# Patient Record
Sex: MALE | Race: WHITE | NOT HISPANIC OR LATINO | Employment: OTHER | URBAN - METROPOLITAN AREA
[De-identification: names, ages, dates, MRNs, and addresses within clinical notes are randomized per-mention and may not be internally consistent; named-entity substitution may affect disease eponyms.]

---

## 2024-07-08 ENCOUNTER — HOSPITAL ENCOUNTER (OUTPATIENT)
Facility: CLINIC | Age: 54
Setting detail: OBSERVATION
Discharge: PSYCHIATRIC OR SUBSTANCE USE FACILITY | End: 2024-07-09
Attending: EMERGENCY MEDICINE
Payer: COMMERCIAL

## 2024-07-08 ENCOUNTER — TELEPHONE (OUTPATIENT)
Dept: BEHAVIORAL HEALTH | Facility: CLINIC | Age: 54
End: 2024-07-08

## 2024-07-08 DIAGNOSIS — F15.90 STIMULANT USE DISORDER: ICD-10-CM

## 2024-07-08 DIAGNOSIS — F12.90 MARIJUANA USE: ICD-10-CM

## 2024-07-08 DIAGNOSIS — R45.851 SUICIDAL IDEATION: ICD-10-CM

## 2024-07-08 DIAGNOSIS — T14.91XA SUICIDE ATTEMPT (H): ICD-10-CM

## 2024-07-08 DIAGNOSIS — F25.0 SCHIZOAFFECTIVE DISORDER, BIPOLAR TYPE (H): ICD-10-CM

## 2024-07-08 PROBLEM — F19.10 POLYSUBSTANCE ABUSE (H): Status: ACTIVE | Noted: 2024-07-08

## 2024-07-08 PROBLEM — F25.9 SCHIZOAFFECTIVE DISORDER, UNSPECIFIED TYPE (H): Status: ACTIVE | Noted: 2024-07-08

## 2024-07-08 LAB
ALBUMIN SERPL BCG-MCNC: 4.6 G/DL (ref 3.5–5.2)
ALBUMIN UR-MCNC: NEGATIVE MG/DL
ALP SERPL-CCNC: 124 U/L (ref 40–150)
ALT SERPL W P-5'-P-CCNC: 48 U/L (ref 0–70)
AMORPH CRY #/AREA URNS HPF: ABNORMAL /HPF
AMPHETAMINES UR QL SCN: ABNORMAL
ANION GAP SERPL CALCULATED.3IONS-SCNC: 9 MMOL/L (ref 7–15)
APAP SERPL-MCNC: <5 UG/ML (ref 10–30)
APPEARANCE UR: CLEAR
AST SERPL W P-5'-P-CCNC: 24 U/L (ref 0–45)
BACTERIA #/AREA URNS HPF: ABNORMAL /HPF
BARBITURATES UR QL SCN: ABNORMAL
BASOPHILS # BLD AUTO: 0.1 10E3/UL (ref 0–0.2)
BASOPHILS NFR BLD AUTO: 1 %
BENZODIAZ UR QL SCN: ABNORMAL
BILIRUB SERPL-MCNC: 0.4 MG/DL
BILIRUB UR QL STRIP: NEGATIVE
BUN SERPL-MCNC: 9.7 MG/DL (ref 6–20)
BZE UR QL SCN: ABNORMAL
CALCIUM SERPL-MCNC: 9 MG/DL (ref 8.6–10)
CANNABINOIDS UR QL SCN: ABNORMAL
CHLORIDE SERPL-SCNC: 97 MMOL/L (ref 98–107)
COLOR UR AUTO: ABNORMAL
CREAT SERPL-MCNC: 0.83 MG/DL (ref 0.67–1.17)
DEPRECATED HCO3 PLAS-SCNC: 28 MMOL/L (ref 22–29)
EGFRCR SERPLBLD CKD-EPI 2021: >90 ML/MIN/1.73M2
EOSINOPHIL # BLD AUTO: 0.1 10E3/UL (ref 0–0.7)
EOSINOPHIL NFR BLD AUTO: 1 %
ERYTHROCYTE [DISTWIDTH] IN BLOOD BY AUTOMATED COUNT: 12.5 % (ref 10–15)
ETHANOL SERPL-MCNC: <0.01 G/DL
FENTANYL UR QL: ABNORMAL
GLUCOSE SERPL-MCNC: 181 MG/DL (ref 70–99)
GLUCOSE UR STRIP-MCNC: NEGATIVE MG/DL
HCT VFR BLD AUTO: 46 % (ref 40–53)
HGB BLD-MCNC: 15.7 G/DL (ref 13.3–17.7)
HGB UR QL STRIP: NEGATIVE
IMM GRANULOCYTES # BLD: 0 10E3/UL
IMM GRANULOCYTES NFR BLD: 0 %
KETONES UR STRIP-MCNC: NEGATIVE MG/DL
LEUKOCYTE ESTERASE UR QL STRIP: NEGATIVE
LYMPHOCYTES # BLD AUTO: 2.9 10E3/UL (ref 0.8–5.3)
LYMPHOCYTES NFR BLD AUTO: 30 %
MAGNESIUM SERPL-MCNC: 2.2 MG/DL (ref 1.7–2.3)
MCH RBC QN AUTO: 30.2 PG (ref 26.5–33)
MCHC RBC AUTO-ENTMCNC: 34.1 G/DL (ref 31.5–36.5)
MCV RBC AUTO: 89 FL (ref 78–100)
MONOCYTES # BLD AUTO: 0.5 10E3/UL (ref 0–1.3)
MONOCYTES NFR BLD AUTO: 5 %
NEUTROPHILS # BLD AUTO: 6.1 10E3/UL (ref 1.6–8.3)
NEUTROPHILS NFR BLD AUTO: 63 %
NITRATE UR QL: NEGATIVE
NRBC # BLD AUTO: 0 10E3/UL
NRBC BLD AUTO-RTO: 0 /100
OPIATES UR QL SCN: ABNORMAL
PCP QUAL URINE (ROCHE): ABNORMAL
PH UR STRIP: 7 [PH] (ref 5–7)
PLATELET # BLD AUTO: 284 10E3/UL (ref 150–450)
POTASSIUM SERPL-SCNC: 4.2 MMOL/L (ref 3.4–5.3)
PROT SERPL-MCNC: 7.4 G/DL (ref 6.4–8.3)
RBC # BLD AUTO: 5.2 10E6/UL (ref 4.4–5.9)
RBC URINE: 1 /HPF
SALICYLATES SERPL-MCNC: <0.3 MG/DL
SODIUM SERPL-SCNC: 134 MMOL/L (ref 135–145)
SP GR UR STRIP: 1.01 (ref 1–1.03)
TSH SERPL DL<=0.005 MIU/L-ACNC: 1.79 UIU/ML (ref 0.3–4.2)
UROBILINOGEN UR STRIP-MCNC: NORMAL MG/DL
WBC # BLD AUTO: 9.7 10E3/UL (ref 4–11)
WBC URINE: 1 /HPF

## 2024-07-08 PROCEDURE — 85025 COMPLETE CBC W/AUTO DIFF WBC: CPT | Performed by: EMERGENCY MEDICINE

## 2024-07-08 PROCEDURE — G0378 HOSPITAL OBSERVATION PER HR: HCPCS

## 2024-07-08 PROCEDURE — 82077 ASSAY SPEC XCP UR&BREATH IA: CPT | Performed by: EMERGENCY MEDICINE

## 2024-07-08 PROCEDURE — 250N000013 HC RX MED GY IP 250 OP 250 PS 637: Performed by: EMERGENCY MEDICINE

## 2024-07-08 PROCEDURE — 80053 COMPREHEN METABOLIC PANEL: CPT | Performed by: EMERGENCY MEDICINE

## 2024-07-08 PROCEDURE — 84443 ASSAY THYROID STIM HORMONE: CPT | Performed by: EMERGENCY MEDICINE

## 2024-07-08 PROCEDURE — 80307 DRUG TEST PRSMV CHEM ANLYZR: CPT | Performed by: EMERGENCY MEDICINE

## 2024-07-08 PROCEDURE — 80143 DRUG ASSAY ACETAMINOPHEN: CPT | Performed by: EMERGENCY MEDICINE

## 2024-07-08 PROCEDURE — 80179 DRUG ASSAY SALICYLATE: CPT | Performed by: EMERGENCY MEDICINE

## 2024-07-08 PROCEDURE — 99285 EMERGENCY DEPT VISIT HI MDM: CPT

## 2024-07-08 PROCEDURE — 93005 ELECTROCARDIOGRAM TRACING: CPT

## 2024-07-08 PROCEDURE — 36415 COLL VENOUS BLD VENIPUNCTURE: CPT | Performed by: EMERGENCY MEDICINE

## 2024-07-08 PROCEDURE — 83735 ASSAY OF MAGNESIUM: CPT | Performed by: EMERGENCY MEDICINE

## 2024-07-08 PROCEDURE — 250N000013 HC RX MED GY IP 250 OP 250 PS 637

## 2024-07-08 PROCEDURE — 81001 URINALYSIS AUTO W/SCOPE: CPT | Performed by: EMERGENCY MEDICINE

## 2024-07-08 RX ORDER — CLONAZEPAM 0.5 MG/1
1 TABLET ORAL
Status: ON HOLD | COMMUNITY
End: 2024-07-16

## 2024-07-08 RX ORDER — CLONAZEPAM 1 MG/1
1 TABLET ORAL
Status: DISCONTINUED | OUTPATIENT
Start: 2024-07-08 | End: 2024-07-09 | Stop reason: HOSPADM

## 2024-07-08 RX ORDER — OLANZAPINE 5 MG/1
5 TABLET ORAL 2 TIMES DAILY PRN
Status: DISCONTINUED | OUTPATIENT
Start: 2024-07-08 | End: 2024-07-09 | Stop reason: HOSPADM

## 2024-07-08 RX ORDER — CHLORPROMAZINE HYDROCHLORIDE 25 MG/1
50 TABLET, FILM COATED ORAL AT BEDTIME
Status: ON HOLD | COMMUNITY
End: 2024-07-16

## 2024-07-08 RX ORDER — OLANZAPINE 5 MG/1
5 TABLET ORAL 2 TIMES DAILY PRN
Status: ON HOLD | COMMUNITY
End: 2024-07-16

## 2024-07-08 RX ORDER — TRAZODONE HYDROCHLORIDE 50 MG/1
50 TABLET, FILM COATED ORAL
Status: DISCONTINUED | OUTPATIENT
Start: 2024-07-08 | End: 2024-07-09 | Stop reason: HOSPADM

## 2024-07-08 RX ORDER — ACETAMINOPHEN 325 MG/1
650 TABLET ORAL EVERY 4 HOURS PRN
Status: DISCONTINUED | OUTPATIENT
Start: 2024-07-08 | End: 2024-07-09 | Stop reason: HOSPADM

## 2024-07-08 RX ORDER — CHLORPROMAZINE HYDROCHLORIDE 50 MG/1
50 TABLET, FILM COATED ORAL AT BEDTIME
Status: DISCONTINUED | OUTPATIENT
Start: 2024-07-08 | End: 2024-07-09 | Stop reason: HOSPADM

## 2024-07-08 RX ORDER — GABAPENTIN 600 MG/1
600 TABLET ORAL 2 TIMES DAILY PRN
Status: ON HOLD | COMMUNITY
End: 2024-07-16

## 2024-07-08 RX ORDER — LANOLIN ALCOHOL/MO/W.PET/CERES
3 CREAM (GRAM) TOPICAL
Status: DISCONTINUED | OUTPATIENT
Start: 2024-07-08 | End: 2024-07-09 | Stop reason: HOSPADM

## 2024-07-08 RX ORDER — GABAPENTIN 600 MG/1
600 TABLET ORAL AT BEDTIME
Status: DISCONTINUED | OUTPATIENT
Start: 2024-07-09 | End: 2024-07-09 | Stop reason: HOSPADM

## 2024-07-08 RX ORDER — GABAPENTIN 600 MG/1
600 TABLET ORAL AT BEDTIME
Status: ON HOLD | COMMUNITY
End: 2024-07-16

## 2024-07-08 RX ORDER — NICOTINE 21 MG/24HR
1 PATCH, TRANSDERMAL 24 HOURS TRANSDERMAL DAILY
Status: DISCONTINUED | OUTPATIENT
Start: 2024-07-08 | End: 2024-07-09 | Stop reason: HOSPADM

## 2024-07-08 RX ORDER — ACETAMINOPHEN 325 MG/1
650 TABLET ORAL ONCE
Status: COMPLETED | OUTPATIENT
Start: 2024-07-08 | End: 2024-07-08

## 2024-07-08 RX ORDER — ARIPIPRAZOLE 5 MG/1
10 TABLET ORAL DAILY
Status: DISCONTINUED | OUTPATIENT
Start: 2024-07-09 | End: 2024-07-09 | Stop reason: HOSPADM

## 2024-07-08 RX ORDER — ARIPIPRAZOLE 10 MG/1
10 TABLET ORAL DAILY
Status: ON HOLD | COMMUNITY
End: 2024-07-16

## 2024-07-08 RX ADMIN — NICOTINE 1 PATCH: 21 PATCH, EXTENDED RELEASE TRANSDERMAL at 22:54

## 2024-07-08 RX ADMIN — CLONAZEPAM 1 MG: 1 TABLET ORAL at 22:54

## 2024-07-08 RX ADMIN — ACETAMINOPHEN 650 MG: 325 TABLET, FILM COATED ORAL at 20:48

## 2024-07-08 ASSESSMENT — COLUMBIA-SUICIDE SEVERITY RATING SCALE - C-SSRS
1. IN THE PAST MONTH, HAVE YOU WISHED YOU WERE DEAD OR WISHED YOU COULD GO TO SLEEP AND NOT WAKE UP?: YES
6. HAVE YOU EVER DONE ANYTHING, STARTED TO DO ANYTHING, OR PREPARED TO DO ANYTHING TO END YOUR LIFE?: YES
3. HAVE YOU BEEN THINKING ABOUT HOW YOU MIGHT KILL YOURSELF?: YES
2. HAVE YOU ACTUALLY HAD ANY THOUGHTS OF KILLING YOURSELF IN THE PAST MONTH?: YES
4. HAVE YOU HAD THESE THOUGHTS AND HAD SOME INTENTION OF ACTING ON THEM?: YES
5. HAVE YOU STARTED TO WORK OUT OR WORKED OUT THE DETAILS OF HOW TO KILL YOURSELF? DO YOU INTEND TO CARRY OUT THIS PLAN?: YES

## 2024-07-08 ASSESSMENT — ACTIVITIES OF DAILY LIVING (ADL)
ADLS_ACUITY_SCORE: 35

## 2024-07-09 ENCOUNTER — TELEPHONE (OUTPATIENT)
Dept: BEHAVIORAL HEALTH | Facility: CLINIC | Age: 54
End: 2024-07-09
Payer: COMMERCIAL

## 2024-07-09 ENCOUNTER — HOSPITAL ENCOUNTER (INPATIENT)
Facility: CLINIC | Age: 54
LOS: 7 days | Discharge: NURSING, REHAB OR LONG-TERM CARE | End: 2024-07-16
Attending: PSYCHIATRY & NEUROLOGY | Admitting: PSYCHIATRY & NEUROLOGY
Payer: COMMERCIAL

## 2024-07-09 VITALS
OXYGEN SATURATION: 97 % | RESPIRATION RATE: 16 BRPM | HEART RATE: 106 BPM | TEMPERATURE: 98.7 F | SYSTOLIC BLOOD PRESSURE: 131 MMHG | BODY MASS INDEX: 31.5 KG/M2 | DIASTOLIC BLOOD PRESSURE: 82 MMHG | HEIGHT: 69 IN | WEIGHT: 212.7 LBS

## 2024-07-09 DIAGNOSIS — F25.9 SCHIZOAFFECTIVE DISORDER, CHRONIC CONDITION WITH ACUTE EXACERBATION (H): Primary | ICD-10-CM

## 2024-07-09 LAB
ATRIAL RATE - MUSE: 108 BPM
DIASTOLIC BLOOD PRESSURE - MUSE: NORMAL MMHG
INTERPRETATION ECG - MUSE: NORMAL
P AXIS - MUSE: 55 DEGREES
PR INTERVAL - MUSE: 132 MS
QRS DURATION - MUSE: 80 MS
QT - MUSE: 326 MS
QTC - MUSE: 436 MS
R AXIS - MUSE: 40 DEGREES
SYSTOLIC BLOOD PRESSURE - MUSE: NORMAL MMHG
T AXIS - MUSE: 45 DEGREES
VENTRICULAR RATE- MUSE: 108 BPM

## 2024-07-09 PROCEDURE — G0378 HOSPITAL OBSERVATION PER HR: HCPCS

## 2024-07-09 PROCEDURE — 124N000002 HC R&B MH UMMC

## 2024-07-09 PROCEDURE — 250N000013 HC RX MED GY IP 250 OP 250 PS 637: Performed by: PSYCHIATRY & NEUROLOGY

## 2024-07-09 PROCEDURE — 99223 1ST HOSP IP/OBS HIGH 75: CPT | Performed by: PSYCHIATRY & NEUROLOGY

## 2024-07-09 PROCEDURE — 250N000013 HC RX MED GY IP 250 OP 250 PS 637

## 2024-07-09 RX ORDER — OLANZAPINE 5 MG/1
5 TABLET ORAL 2 TIMES DAILY PRN
Status: DISCONTINUED | OUTPATIENT
Start: 2024-07-09 | End: 2024-07-09

## 2024-07-09 RX ORDER — GABAPENTIN 600 MG/1
600 TABLET ORAL AT BEDTIME
Status: DISCONTINUED | OUTPATIENT
Start: 2024-07-09 | End: 2024-07-16 | Stop reason: HOSPADM

## 2024-07-09 RX ORDER — ACETAMINOPHEN 325 MG/1
650 TABLET ORAL EVERY 4 HOURS PRN
Status: DISCONTINUED | OUTPATIENT
Start: 2024-07-09 | End: 2024-07-16 | Stop reason: HOSPADM

## 2024-07-09 RX ORDER — MAGNESIUM HYDROXIDE/ALUMINUM HYDROXICE/SIMETHICONE 120; 1200; 1200 MG/30ML; MG/30ML; MG/30ML
30 SUSPENSION ORAL EVERY 4 HOURS PRN
Status: DISCONTINUED | OUTPATIENT
Start: 2024-07-09 | End: 2024-07-16 | Stop reason: HOSPADM

## 2024-07-09 RX ORDER — HYDROXYZINE HYDROCHLORIDE 25 MG/1
25 TABLET, FILM COATED ORAL EVERY 4 HOURS PRN
Status: DISCONTINUED | OUTPATIENT
Start: 2024-07-09 | End: 2024-07-16 | Stop reason: HOSPADM

## 2024-07-09 RX ORDER — NICOTINE 21 MG/24HR
1 PATCH, TRANSDERMAL 24 HOURS TRANSDERMAL DAILY
Status: DISCONTINUED | OUTPATIENT
Start: 2024-07-10 | End: 2024-07-09

## 2024-07-09 RX ORDER — POLYETHYLENE GLYCOL 3350 17 G/17G
17 POWDER, FOR SOLUTION ORAL DAILY PRN
Status: DISCONTINUED | OUTPATIENT
Start: 2024-07-09 | End: 2024-07-16 | Stop reason: HOSPADM

## 2024-07-09 RX ORDER — OLANZAPINE 10 MG/1
10 TABLET ORAL 3 TIMES DAILY PRN
Status: DISCONTINUED | OUTPATIENT
Start: 2024-07-09 | End: 2024-07-16 | Stop reason: HOSPADM

## 2024-07-09 RX ORDER — LANOLIN ALCOHOL/MO/W.PET/CERES
3 CREAM (GRAM) TOPICAL
Status: DISCONTINUED | OUTPATIENT
Start: 2024-07-09 | End: 2024-07-16 | Stop reason: HOSPADM

## 2024-07-09 RX ORDER — NICOTINE 21 MG/24HR
1 PATCH, TRANSDERMAL 24 HOURS TRANSDERMAL DAILY
Status: DISCONTINUED | OUTPATIENT
Start: 2024-07-09 | End: 2024-07-16 | Stop reason: HOSPADM

## 2024-07-09 RX ORDER — CHLORPROMAZINE HYDROCHLORIDE 50 MG/1
50 TABLET, FILM COATED ORAL AT BEDTIME
Status: DISCONTINUED | OUTPATIENT
Start: 2024-07-09 | End: 2024-07-16 | Stop reason: HOSPADM

## 2024-07-09 RX ORDER — GABAPENTIN 600 MG/1
600 TABLET ORAL 2 TIMES DAILY PRN
Status: DISCONTINUED | OUTPATIENT
Start: 2024-07-09 | End: 2024-07-16 | Stop reason: HOSPADM

## 2024-07-09 RX ORDER — ARIPIPRAZOLE 10 MG/1
10 TABLET ORAL DAILY
Status: DISCONTINUED | OUTPATIENT
Start: 2024-07-10 | End: 2024-07-10

## 2024-07-09 RX ORDER — OLANZAPINE 10 MG/2ML
10 INJECTION, POWDER, FOR SOLUTION INTRAMUSCULAR 3 TIMES DAILY PRN
Status: DISCONTINUED | OUTPATIENT
Start: 2024-07-09 | End: 2024-07-16 | Stop reason: HOSPADM

## 2024-07-09 RX ORDER — CLONAZEPAM 0.5 MG/1
1 TABLET ORAL
Status: DISCONTINUED | OUTPATIENT
Start: 2024-07-09 | End: 2024-07-16 | Stop reason: HOSPADM

## 2024-07-09 RX ADMIN — NICOTINE POLACRILEX 2 MG: 2 GUM, CHEWING BUCCAL at 09:11

## 2024-07-09 RX ADMIN — OLANZAPINE 5 MG: 5 TABLET, FILM COATED ORAL at 09:11

## 2024-07-09 RX ADMIN — ARIPIPRAZOLE 10 MG: 5 TABLET ORAL at 09:07

## 2024-07-09 RX ADMIN — CLONAZEPAM 1 MG: 0.5 TABLET ORAL at 21:15

## 2024-07-09 RX ADMIN — GABAPENTIN 600 MG: 600 TABLET, FILM COATED ORAL at 21:15

## 2024-07-09 RX ADMIN — CHLORPROMAZINE HYDROCHLORIDE 50 MG: 50 TABLET, FILM COATED ORAL at 21:17

## 2024-07-09 RX ADMIN — NICOTINE 1 PATCH: 21 PATCH, EXTENDED RELEASE TRANSDERMAL at 21:38

## 2024-07-09 ASSESSMENT — ACTIVITIES OF DAILY LIVING (ADL)
ADLS_ACUITY_SCORE: 35
ADLS_ACUITY_SCORE: 35
ADLS_ACUITY_SCORE: 30
ADLS_ACUITY_SCORE: 35
ADLS_ACUITY_SCORE: 35
DRESS: INDEPENDENT
ADLS_ACUITY_SCORE: 30
ADLS_ACUITY_SCORE: 45
ADLS_ACUITY_SCORE: 35
ADLS_ACUITY_SCORE: 43
ADLS_ACUITY_SCORE: 35
HYGIENE/GROOMING: INDEPENDENT
ADLS_ACUITY_SCORE: 35
ORAL_HYGIENE: INDEPENDENT
ADLS_ACUITY_SCORE: 35

## 2024-07-09 ASSESSMENT — LIFESTYLE VARIABLES: SKIP TO QUESTIONS 9-10: 1

## 2024-07-09 NOTE — ED NOTES
Patient is unsure of the facility he lives at and did not come with any paperwork. Per triage, patient was dropped off by a nurse and then he left. Per chart review patient resides at [facility] [phone]. RN has called facility twice and both times was sent to voicemail without the option to leave a message.

## 2024-07-09 NOTE — ED NOTES
Patient very tired today. States he had a bad day yesterday. Some of the clients at the assisted living were bothering him and he was hearing voices as well. He denies hearing any voices today and he denies SI
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
## 2024-07-09 NOTE — PROGRESS NOTES
Patient got up and went to the bathroom. States he is feeling better than before. Denies thoughts of SI at the moment. Back in the recliner resting.

## 2024-07-09 NOTE — ED NOTES
"54 year old male with history of bipolar, past suicide attempts, substance abuse, and schizoaffective received from ED due to a suicide attempt. Reports that he stockpiled pills because \"I just wanted to end it\". Pt states that he has just \"given up\". Pt has been struggling with substance abuse and reports that he has been to treatment 21 times, but does report that he would like to go to treatment in [place] after inpatient mental healthcare. Pt endorses HI of wanting to harm everybody and anybody. Nursing and risk assessments completed. Assessments reviewed with LMHP and physician. Admission information reviewed with patient. Patient given a tour of Em[facility] and instructions on using the facility. Questions regarding [facility] addressed. Pt safety search completed.      Pt is calm and cooperative. Pt appears down and depressed.  Pt contracts for safety on the unit. Thorazine dose was reported by nurse Meli via text picture of 50mg daily at bedtime, but medication records show he may have been prescribed a different dose.    Hallie from poison control called at [phone] to check on pt and clear him medically.  Pt reports that psychiatrist is Jovana Malagon DNP.      "

## 2024-07-09 NOTE — TELEPHONE ENCOUNTER
R:  [place]  Access Inpatient Bed Call Log 7/8/2024 @ 11:45 PM   Intake has called facilities that have not updated their bed status within the last 12 hours.    [facility] is posting 0 beds.               [facility] is posting 0 beds. [phone]   [facility] is posting 0 beds. [phone]               [facility] is posting 0 beds. [phone]. Per Ariella @ 12:28AM, they are at capacity tonight  [facility] is posting 0 beds. [phone]   [facility] (Ages 18-35) is posting 2 beds. Negative COVID test required, no recent or significant aggression, violence, or sexual assault. [phone]. Pt not age appropriate   [facility] is posting 0 beds. [phone]             [facility] is posting 0 beds. [phone]      [facility] is posting 0 beds. [phone]         Pt remains on work list pending appropriate bed availability.

## 2024-07-09 NOTE — ED NOTES
IP MH Referral Acuity Rating Score (RARS)    LMHP complete at referral to IP MH, with DEC; and, daily while awaiting IP MH placement. Call score to PPS.  CRITERIA SCORING   New 72 HH and Involuntary for IP MH (not adolescent) 0/1   Boarding over 24 hours 0/1   Vulnerable adult at least 55+ with multiple co morbidities; or, Patient age 11 or under 0/1   Suicide ideation without relief of precipitating factors 1/1   Current plan for suicide 1/1   Current plan for homicide 0/1   Imminent risk or actual attempt to seriously harm another without relief of factors precipitating the attempt 0/1   Severe dysfunction in daily living (ex: complete neglect for self care, extreme disruption in vegetative function, extreme deterioration in social interactions) 1/1   Recent (last 2 weeks) or current physical aggression in the ED 0/1   Restraints or seclusion episode in ED 0/1   Verbal aggression, agitation, yelling, etc., while in the ED 0/1   Active psychosis with psychomotor agitation or catatonia 1/1   Need for constant or near constant redirection (from leaving, from others, etc).  0/1   Intrusive or disruptive behaviors 0/1   TOTAL Acuity Total Score: 4     DIEGO Bojorquez
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
## 2024-07-09 NOTE — TELEPHONE ENCOUNTER
S: ESTHER Oates  Allison  calling at 10:47 PM  about a 54 year old/Male presenting with SA.    B: Pt arrived via  Facility staff from assisted living brought him in . Presenting problem, stressors: Came in following a SA, ingested gabapentin and thorazine has been stockpiling to overdose. Also plans to purchase a firearm to kill himself. Also experiencing paranoia and AH where people are telling him that they don't like him and want to kill him. Been using meth more frequent and marijuana daily, endorses non spec thoughts to hurt the people who want to harm him.    Pt affect in ED: Blunted  Pt Dx: Schizoaffective Disorder Bipolar type  Previous IPMH hx? Yes: In [place] in APR/MAY  Pt endorses SI with a plan to Use firearm or overdose    Hx of suicide attempt? Yes: Overdosed on muscle relaxers leading to SA in [place] earlier this year and previous attempt of trying to hang himself  Pt denies SIB  Pt endorses HI towards The voices/people who want to kill him, no plans or intent   Pt endorses auditory hallucinations .   Pt RARS Score: 4    Hx of aggression/violence, sexual offenses, legal concerns, Epic care plan? describe: No  Current concerns for aggression this visit? No  Does pt have a history of Civil Commitment? No  Is Pt their own guardian? Yes    Pt is prescribed medication. Is patient medication compliant? No Stockpiled   Pt endorses OP services: Receives supports from assisted living  CD concerns: Actively using/consuming meth and marijuana  Acute or chronic medical concerns: No  Does Pt present with specific needs, assistive devices, or exclusionary criteria? None      Pt is ambulatory  Pt is able to perform ADLs independently      A: Pt to be reviewed for [facility] admission. Pt is Voluntary  Preferred placement: [facility]    COVID Symptoms: No  If yes, COVID test required   Utox: Positive for cannabinoids, amphetamines    CMP: Abnormalities: See labs  CBC: WNL  HCG: N/A    R: Patient cleared and ready for  behavioral bed placement: Yes  Pt placed on IP worklist? Yes    Does Patient need a Transfer Center request created? No, Pt is located within [facility] ED, [facility] ED, or [facility] 10:56 PM  R: [place]  Access Inpatient Bed Call Log 7/8/2024 10:55 PM   Intake has called facilities that have not updated their bed status within the last 12 hours.??         *[facility]-- [facility]: @ cap per website.   [place]-- [facility]:  Posting 0 beds. [phone] ECT Tx Patients need to go through APS.   [place]-- [facility]: Posting 0 beds. ECT Tx offered.        [place]-- [facility]:  Posting 0 beds.   [facility]-- [facility]: Posting 0 beds. [phone] ECT Tx offered.   [place] -- [facility] 0 YA beds.  [place] -- [facility] Posting 0 beds. [phone]     [place] -- [facility]: @ cap per website.   [place] -- [facility]:  Posting 0 beds. Low acuity only. [phone]      Pt remains on worklist pending appropriate bed availability.

## 2024-07-09 NOTE — PROGRESS NOTES
Benitez Castillo  July 8, 2024  Plan of Care Hand-off Note     Patient Care Path: inpatient mental health    Plan for Care:   It is the recommendation of this writer that pt be admitted to an inpatient psychiatric unit on the basis of his suicide attempt with continued suicidal plan and intent. Pt is also presenting with paranoia and auditory hallucinations. Pt is voluntary for inpatient admission.    Identified Goals and Safety Issues: decrease SI, AH, and paranoia    Overview:  Gabriela Weston, daughter, PH: [phone] (NEERAJ signed)  Cora [facility] [address] staff, PH: [phone]    Legal Status at Admission: Voluntary/Patient has signed consent for treatment    Psychiatry Consult: No psychiatry consult needed. Pt is at [facility].     Updated MD and RN regarding plan of care.      MOSHE BojorquezSW

## 2024-07-09 NOTE — ED NOTES
[facility]  ED to EMPATH Checklist:      Goal for EMPATH: Suicidality    Current Behavior: Flat Affect, Calm, and Cooperative    Safety Concerns: Suicidal, with a plan to overdose    Legal Hold Status: Voluntary    Medically Cleared by ED provider: Yes and Vital signs stable    Patient Therapeutically Searched: Therapeutic search by ED staff (strings, belts, shoes, pockets, electronics, etc.)    Belongings: Remain with patient    Independent Ambulation at Baseline: Yes/No: Yes    Participates in Care/Conversation: Yes/No: Yes    Patient Informed about EMPATH: Yes/No: Yes    DEC: Ordered and completed    Patient Ready to be Transferred to EMPATH? Yes/No: Yes

## 2024-07-09 NOTE — ED NOTES
Intake called and pt has been accepted to  [facility] at [place]  Dr Hobbs  [phone]  Pt remains calm and quiet.

## 2024-07-09 NOTE — TELEPHONE ENCOUNTER
R: [place]  Access Inpatient Bed Call Log 7/9/24 @7:24 AM:   Intake has called facilities that have not updated the bed status within the last 12 hours.                               [facility] is at capacity            [facility] is posting 0 beds. [phone]    [facility] is posting 0 beds. Negative covid required   [facility] is posting 0 beds. Neg covid. No [facility]. [phone]. Per call at 7:30am to [facility] currently FULL.   [facility] is posting 0 beds. [phone]   [facility] is posting 0 beds. [phone]    [facility] is posting 2 beds. Negative covid. [phone]. Per call at 7:30am to Ben a couple YA and adol beds, however, no child or high acuity beds available.   [facility] is posting 0 beds [phone]       Pt remains on the work list pending appropriate bed availability.      11:17 AM Paged Anjel to review for admission to .   11:20 AM [facility] called and accepts pt for admission to .   11:26 AM Called 3[facility] to inform of pt in queue. She states they are capped at 12 so cannot admit until pending discharge occurs later this shift.   11:29 AM Called Me[facility] and provided placement.   3:07 PM Per LETICIA Desai, unit will call for pt at 4 PM.

## 2024-07-09 NOTE — ED PROVIDER NOTES
"  Emergency Department Note      History of Present Illness     Chief Complaint   Suicidal and Suicide Attempt      HPI   Benitez Castillo is a 54 year old male with a history of suicide attempts, schizoaffective disorder, bipolar disorder, depression, GERD, hypertension, hyperlipidemia, and type 2 diabetes who presents to the ED for a suicide attempt. The patient reports he has been feeling suicidal for the past couple of weeks and saved up a lot of his medications in order to overdose on them. He states that he took 6 thorazine (25 mg) and 20 gabapentin (600 mg) tablets around 5 PM today, which was about 2-1/2 hours prior to my evaluation. He also states that he used meth last night and a few days ago. His nurse from his group home brought him here and he says he wants to be here to take his medications correctly. He endorses homicidal ideation as well and wants to hurt all the people bothering him which he states is \"everybody\", but he denies ever acting on it. He also endorses some fatigue. He denies nausea or vomiting.  He also denies taking anything else such as over-the-counter medications or alcohol.    Independent Historian   None    Review of External Notes   I reviewed the [facility] Service note from 07/04/24.    Past Medical History     Medical History and Problem List   Alcoholism /alcohol abuse  Bipolar disorder  Asthma  Depression  GERD  Hallucinations  Hepatitis C  History of psychiatric care  History of suicide attempt  Hyperlipidemia  Insomnia  Marijuana abuse  Methamphetamine use disorder  Paranoia  Polysubstance abuse  Schizoaffective disorder, bipolar type  Suicide attempt  Suicidal ideations  Tobacco abuse  Type 2 diabetes without complication, without long-term current use of insulin    Medications   Aripiprazole  Chlorpromazine  Lamotrigine  Albuterol inhaler  Trazodone  Mirtazapine  Quetiapine fumarate    Surgical History   Cystectomy pilonidal  Dental surgery  Hernia repair    Physical " "Exam     Patient Vitals for the past 24 hrs:   BP Temp Temp src Pulse Resp SpO2 Height Weight   07/08/24 2211 126/78 98.3  F (36.8  C) Oral 97 16 97 % -- --   07/08/24 2210 -- -- -- -- -- -- 1.753 m (5' 9\") 96.5 kg (212 lb 11.2 oz)   07/08/24 2105 122/76 -- -- 102 14 -- -- --   07/08/24 1936 (!) 151/99 98.7  F (37.1  C) Temporal 118 20 97 % -- --     Physical Exam  Nursing note and vitals reviewed.  Constitutional:  Oriented to person, place, and time. Cooperative.   HENT:   Nose:    Nose normal.   Mouth/Throat:   Mucous membranes are normal.   Eyes:    Conjunctivae normal and EOM are normal.      Pupils are equal, round, and reactive to light.   Neck:    Trachea normal.   Cardiovascular:  Tachycardic rate, regular rhythm, normal heart sounds and normal pulses. No murmur heard.  Pulmonary/Chest:  Effort normal and breath sounds normal.   Abdominal:   Soft. Normal appearance and bowel sounds are normal.      There is no tenderness.      There is no rebound and no CVA tenderness.   Musculoskeletal:  Extremities atraumatic x 4.   Lymphadenopathy:  No cervical adenopathy.   Neurological:   Alert and oriented to person, place, and time. Normal strength.      No cranial nerve deficit or sensory deficit. GCS eye subscore is 4. GCS verbal subscore is 5. GCS motor subscore is 6.   Skin:    Skin is intact. No rash noted.   Psychiatric:   Flat affect presents with ongoing suicidal ideation and vague homicidal ideation.      Diagnostics     Lab Results   Labs Ordered and Resulted from Time of ED Arrival to Time of ED Departure   COMPREHENSIVE METABOLIC PANEL - Abnormal       Result Value    Sodium 134 (*)     Potassium 4.2      Carbon Dioxide (CO2) 28      Anion Gap 9      Urea Nitrogen 9.7      Creatinine 0.83      GFR Estimate >90      Calcium 9.0      Chloride 97 (*)     Glucose 181 (*)     Alkaline Phosphatase 124      AST 24      ALT 48      Protein Total 7.4      Albumin 4.6      Bilirubin Total 0.4     ROUTINE UA WITH " MICROSCOPIC REFLEX TO CULTURE - Abnormal    Color Urine Light Yellow      Appearance Urine Clear      Glucose Urine Negative      Bilirubin Urine Negative      Ketones Urine Negative      Specific Gravity Urine 1.009      Blood Urine Negative      pH Urine 7.0      Protein Albumin Urine Negative      Urobilinogen Urine Normal      Nitrite Urine Negative      Leukocyte Esterase Urine Negative      Bacteria Urine Few (*)     Amorphous Crystals Urine Few (*)     RBC Urine 1      WBC Urine 1     ACETAMINOPHEN LEVEL - Abnormal    Acetaminophen <5.0 (*)    URINE DRUG SCREEN PANEL - Abnormal    Amphetamines Urine Screen Positive (*)     Barbituates Urine Screen Negative      Benzodiazepine Urine Screen Negative      Cannabinoids Urine Screen Positive (*)     Cocaine Urine Screen Negative      Fentanyl Qual Urine Screen Negative      Opiates Urine Screen Negative      PCP Urine Screen Negative     MAGNESIUM - Normal    Magnesium 2.2     TSH WITH FREE T4 REFLEX - Normal    TSH 1.79     ETHYL ALCOHOL LEVEL - Normal    Alcohol ethyl <0.01     SALICYLATE LEVEL - Normal    Salicylate <0.3     CBC WITH PLATELETS AND DIFFERENTIAL    WBC Count 9.7      RBC Count 5.20      Hemoglobin 15.7      Hematocrit 46.0      MCV 89      MCH 30.2      MCHC 34.1      RDW 12.5      Platelet Count 284      % Neutrophils 63      % Lymphocytes 30      % Monocytes 5      % Eosinophils 1      % Basophils 1      % Immature Granulocytes 0      NRBCs per 100 WBC 0      Absolute Neutrophils 6.1      Absolute Lymphocytes 2.9      Absolute Monocytes 0.5      Absolute Eosinophils 0.1      Absolute Basophils 0.1      Absolute Immature Granulocytes 0.0      Absolute NRBCs 0.0         Imaging   No orders to display       EKG   ECG results from 07/08/24   EKG 12-lead, tracing only     Value    Systolic Blood Pressure     Diastolic Blood Pressure     Ventricular Rate 108    Atrial Rate 108    MT Interval 132    QRS Duration 80        QTc 436    P Axis 55     R AXIS 40    T Axis 45    Interpretation ECG      Sinus tachycardia  Otherwise normal ECG  ECG taken at 1959, ECG read at 2003.         Independent Interpretation   None    ED Course      Medications Administered   Medications   nicotine (NICODERM CQ) 21 MG/24HR 24 hr patch 1 patch (1 patch Transdermal $Patch/Med Applied 7/8/24 2254)   acetaminophen (TYLENOL) tablet 650 mg (has no administration in time range)   melatonin tablet 3 mg (has no administration in time range)   traZODone (DESYREL) tablet 50 mg (has no administration in time range)   ARIPiprazole (ABILIFY) tablet 10 mg (has no administration in time range)   chlorproMAZINE (THORAZINE) tablet 50 mg (50 mg Oral Not Given 7/8/24 2254)   clonazePAM (klonoPIN) tablet 1 mg (1 mg Oral $Given 7/8/24 2254)   gabapentin (NEURONTIN) tablet 600 mg (has no administration in time range)   OLANZapine (zyPREXA) tablet 5 mg (has no administration in time range)   nicotine (NICORETTE) gum 2 mg (2 mg Buccal Not Given 7/8/24 2254)   acetaminophen (TYLENOL) tablet 650 mg (650 mg Oral $Given 7/8/24 2048)       Procedures   Procedures     Discussion of Management   None    ED Course   ED Course as of 07/08/24 2338   Mon Jul 08, 2024 1946 I obtained history and examined the patient as noted above.   2000 I called poison control.       Optional/Additional Documentation  None    Medical Decision Making / Diagnosis     CMS Diagnoses: None    MIPS       None    MDM   Benitez Castillo is a 54 year old male who came in after he overdosed on some of his medications in a suicide attempt.  He did come in voluntarily, but we still placed him on an ANTHONY initially.  I spoke with poison control about what he potentially took and they indicated that he would only need to be watched for up to 4 hours.  I also proceeded with the above workup to ensure he did not ingest anything else.  His workup came back unremarkable, other than his urine drug screen, which came back positive for amphetamines  and marijuana.  He was seen by DEC, and they recommend inpatient admission for his suicidal ideation.  Once he was medically cleared, he was then transferred to [facility] as well.    Disposition   The patient was transferred to [facility].     Diagnosis     ICD-10-CM    1. Suicidal ideation  R45.851       2. Suicide attempt (H)  T14.91XA       3. Schizoaffective disorder, unspecified type (H)  F25.9       4. Polysubstance abuse (H)  F19.10                Scribe Disclosure:  I, Saqib Salcedo, am serving as a scribe at 8:04 PM on 7/8/2024 to document services personally performed by Sahil Viveros MD based on my observations and the provider's statements to me.        Sahil Viveros MD  07/08/24 6760

## 2024-07-09 NOTE — ED NOTES
RN spoke with Adwoa Serrano at [address] and gave nurse to nurse report. RN arranged transport via EMS and plan for  in the next 2 hours. Patient notified of plan to transport this evening

## 2024-07-09 NOTE — ED TRIAGE NOTES
Patient comes in with suicidal thoughts. He states he was dropped off by his nurse from his assisted living.  He also took 6 tabs of his thorazine and about 2600mg of gabapentin tonight around 1700 in a suicide attempt.  He is here for help.

## 2024-07-10 LAB
HBA1C MFR BLD: 9.8 %
HCV AB SERPL QL IA: REACTIVE
HOLD SPECIMEN: NORMAL

## 2024-07-10 PROCEDURE — 86803 HEPATITIS C AB TEST: CPT

## 2024-07-10 PROCEDURE — 87522 HEPATITIS C REVRS TRNSCRPJ: CPT

## 2024-07-10 PROCEDURE — 250N000013 HC RX MED GY IP 250 OP 250 PS 637: Performed by: PSYCHIATRY & NEUROLOGY

## 2024-07-10 PROCEDURE — 99255 IP/OBS CONSLTJ NEW/EST HI 80: CPT

## 2024-07-10 PROCEDURE — 250N000013 HC RX MED GY IP 250 OP 250 PS 637

## 2024-07-10 PROCEDURE — 36415 COLL VENOUS BLD VENIPUNCTURE: CPT

## 2024-07-10 PROCEDURE — 83036 HEMOGLOBIN GLYCOSYLATED A1C: CPT

## 2024-07-10 PROCEDURE — 87902 NFCT AGT GNTYP ALYS HEP C: CPT

## 2024-07-10 PROCEDURE — 99223 1ST HOSP IP/OBS HIGH 75: CPT | Performed by: PSYCHIATRY & NEUROLOGY

## 2024-07-10 PROCEDURE — 124N000002 HC R&B MH UMMC

## 2024-07-10 PROCEDURE — 99418 PROLNG IP/OBS E/M EA 15 MIN: CPT

## 2024-07-10 RX ORDER — METFORMIN HCL 500 MG
500 TABLET, EXTENDED RELEASE 24 HR ORAL
Status: DISCONTINUED | OUTPATIENT
Start: 2024-07-10 | End: 2024-07-16

## 2024-07-10 RX ADMIN — NICOTINE POLACRILEX 4 MG: 4 GUM, CHEWING BUCCAL at 17:51

## 2024-07-10 RX ADMIN — CHLORPROMAZINE HYDROCHLORIDE 50 MG: 50 TABLET, FILM COATED ORAL at 21:03

## 2024-07-10 RX ADMIN — NICOTINE 1 PATCH: 21 PATCH, EXTENDED RELEASE TRANSDERMAL at 08:53

## 2024-07-10 RX ADMIN — CLONAZEPAM 1 MG: 0.5 TABLET ORAL at 20:11

## 2024-07-10 RX ADMIN — Medication 15 MG: at 08:53

## 2024-07-10 RX ADMIN — GABAPENTIN 600 MG: 600 TABLET, FILM COATED ORAL at 14:42

## 2024-07-10 RX ADMIN — METFORMIN HYDROCHLORIDE 500 MG: 500 TABLET, EXTENDED RELEASE ORAL at 17:12

## 2024-07-10 RX ADMIN — GABAPENTIN 600 MG: 600 TABLET, FILM COATED ORAL at 21:03

## 2024-07-10 ASSESSMENT — ACTIVITIES OF DAILY LIVING (ADL)
ADLS_ACUITY_SCORE: 30

## 2024-07-10 NOTE — PLAN OF CARE
BEH IP Unit Acuity Rating Score (UARS)  Patient is given one point for every criteria they meet.    CRITERIA SCORING   On a 72 hour hold, court hold, committed, stay of commitment, or revocation. 0    Patient LOS on BEH unit exceeds 20 days. 0  LOS: 1   Patient under guardianship, 55+, otherwise medically complex, or under age 11. 1   Suicide ideation without relief of precipitating factors. 1   Current plan for suicide. 1   Current plan for homicide. 0   Imminent risk or actual attempt to seriously harm another without relief of factors precipitating the attempt. 1   Severe dysfunction in daily living (ex: complete neglect for self care, extreme disruption in vegetative function, extreme deterioration in social interactions). 1   Recent (last 7 days) or current physical aggression in the ED or on unit. 0   Restraints or seclusion episode in past 72 hours. 0   Recent (last 7 days) or current verbal aggression, agitation, yelling, etc., while in the ED or unit. 0   Active psychosis. 0   Need for constant or near constant redirection (from leaving, from others, etc).  0   Intrusive or disruptive behaviors. 0   Patient requires 3 or more hours of individualized nursing care per 8-hour shift (i.e. for ADLs, meds, therapeutic interventions). 0   TOTAL 5

## 2024-07-10 NOTE — PLAN OF CARE
Problem: Adult Behavioral Health Plan of Care  Goal: Adheres to Safety Considerations for Self and Others  Outcome: Not Progressing  Intervention: Develop and Maintain Individualized Safety Plan  Recent Flowsheet Documentation  Taken 7/10/2024 1025 by Brittany Gonzalez RN  Safety Measures:   safety rounds completed   suicide check-in completed   Goal Outcome Evaluation:    Plan of Care Reviewed With: patient    Pt presenting flat but brighten on approached . Pt out in the milieu interacting well with peers and staff . Pt is medication complaint . Pt does admits he continue  to hear voices though they are not as loud telling him to hurt himself but contracts for safety . He states he is always Anxious and depressed due to the voices he keeps hearing .   Pt was given Gabapentin 600mg for Anxiety

## 2024-07-10 NOTE — PROGRESS NOTES
07/09/24 2242   Patient Belongings   Did you bring any home meds/supplements to the hospital?  No   Belongings Search Yes   Clothing Search Yes   Second Staff Yvon and Hemant   Comment See Notes     Pt Locker:  Blue pair of jeans, Belt  Shoes, socks  Cell phone  Cigarettes box/card becker  2 E cigarettes  Lighter  Key  Wallet   56 cents   Valuables Env# [id number]  [place] ID card  Visa card ([id number])  EBT Card(9808)  .be  EBT card (0455)  A               Admission:  I am responsible for any personal items that are not sent to the safe or pharmacy.  [facility] is not responsible for loss, theft or damage of any property in my possession.    Signature:  _________________________________ Date: _______  Time: _____                                              Staff Signature:  ____________________________ Date: ________  Time: _____      2nd Staff person, if patient is unable/unwilling to sign:    Signature: ________________________________ Date: ________  Time: _____     Discharge:  [facility] has returned all of my personal belongings:    Signature: _________________________________ Date: ________  Time: _____                                          Staff Signature:  ____________________________ Date: ________  Time: _____
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
## 2024-07-10 NOTE — PLAN OF CARE
Problem: Suicide Risk  Goal: Absence of Self-Harm  Outcome: Progressing   Goal Outcome Evaluation:       Patient was sleeping soundly in the beginning of the shift. No complaints made. No behavioral issues observed. He slept the whole night uninterrupted for 8.5 hours.

## 2024-07-10 NOTE — PROVIDER NOTIFICATION
07/10/24 1219   Individualization/Patient Specific Goals   Patient Personal Strengths family/social support;expressive of emotions;resourceful;resilient;stable living environment   Patient Vulnerabilities lacks insight into illness;substance abuse/addiction;history of unsuccessful treatment;traumatic event;other (see comments)  (one of pt's sons  by suicide in .)   Anxieties, Fears or Concerns Pt endorses SI with plan to either overdose on medication or purchase a gun   Patient/Family-Specific Goals (Include Timeframe) Family would like to see pt back on VIMAL medication   Interprofessional Rounds   Summary Pt admitted to unit last night. Pt has been isolative in his room. Discharge plan is being formulated as we are gathering collateral information.   Participants nursing;OT;psychiatrist;CTC   Behavioral Team Discussion   Participants Dr. Hemant Hobbs MD; Brittany Gonzalez RN; Rosa Maria Johns, OT; Carmen Lilly, BRANDI, [facility], CTC   Progress pt just admitted to unit and is still orienting or stabilizing.   Anticipated length of stay 7-10 days   Continued Stay Criteria/Rationale medication management and evaluation, address and monitor SI and HI.   Medical/Physical see H&P   Precautions see below   Plan to return to assisted living   Rationale for change in precautions or plan NA   Safety Plan per unit protocol   Anticipated Discharge Disposition assisted living     PRECAUTIONS AND SAFETY    Behavioral Orders   Procedures    Code 1 - Restrict to Unit    Routine Programming     As clinically indicated    Status 15     Every 15 minutes.    Suicide precautions: Suicide Risk: LOW; Clinical rationale to override score: modification to the care environment, response to medication     Consider searching patient belongings according to policy for contraband or items that could be used for self-harm.     Order Specific Question:   Suicide Risk     Answer:   LOW     Order Specific Question:   Clinical rationale to override  score:     Answer:   modification to the care environment     Order Specific Question:   Clinical rationale to override score:     Answer:   response to medication       Safety  Patient Location: conference room  Observed Behavior: sitting  Safety Measures: safety rounds completed, suicide check-in completed  Suicidality: Status 15, Unpredictable frequency of checking on patient

## 2024-07-10 NOTE — PLAN OF CARE
Problem: Suicide Risk  Goal: Absence of Self-Harm  Outcome: Progressing  Intervention: Assess Risk to Self and Maintain Safety    Problem: Depressive Symptoms  Goal: Depressive Symptoms  Description: Signs and symptoms of listed problems will be absent or manageable.  Outcome: Progressing     Problem: Pain Acute  Goal: Optimal Pain Control and Function  Outcome: Progressing  Intervention: Prevent or Manage Pain  Patient in room at the beginning of the shift, eating and drinking adequately. Patient endorsed SI thoughts but kush for safety, safety checks and precautions in place. Patient denies pain or discomfort. Denies anxiety, but endorse depression. Medication compliant. No side effects of medications noted. Selectively engage with peers, no behavioral or safety concerns noted. Will continue to monitor and offer therapeutic support as needed for the rest of the shift per plan of care.
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
## 2024-07-10 NOTE — PLAN OF CARE
Goal Outcome Evaluation:    Problem: Adult Behavioral Health Plan of Care  Goal: Plan of Care Review  Outcome: Progressing  Flowsheets (Taken 7/9/2024 1938)  Patient Agreement with Plan of Care: agrees     Problem: Suicide Risk  Goal: Absence of Self-Harm  Outcome: Progressing  Intervention: Assess Risk to Self and Maintain Safety  Recent Flowsheet Documentation  Taken 7/9/2024 1938 by Adwoa Cristina RN  Behavior Management: impulse control promoted  Self-Harm Prevention: environmental self-harm risks assessed  Enhanced Safety Measures: review medications for side effects with activity  Intervention: Promote Psychosocial Wellbeing  Recent Flowsheet Documentation  Taken 7/9/2024 1938 by Adwoa Cristina RN  Supportive Measures:   active listening utilized   decision-making supported   positive reinforcement provided   relaxation techniques promoted   self-care encouraged   verbalization of feelings encouraged  Sleep/Rest Enhancement:   comfort measures   medication  Family/Support System Care: (no family presnt this shift) other (see comments)  Intervention: Establish Safety Plan and Continuity of Care  Recent Flowsheet Documentation  Taken 7/9/2024 1938 by Adwoa Cristina RN  Safe Transition Promotion: protective factors promoted     Admission Note    S: Pt is a 54 year old male admitted from [facility] for reported recent suicide attempt with drug overdose of several tablets of Thorazine and Gabapentin yesterday.     B: Pt has a past history for schizoaffective  Disorder which is complicated by substance use. Pt reports that his medications have partially been effective in maintaining mood stability and reducing auditory hallucinations. Pt's recent relapsed on meth led to worsening of psych symptoms because it led to nonadherent of all psych medications. Pt began to experience command hallucinations to commit suicide, which he attempted before going to ED. Pt is still endorsing auditory hallucinations but  "he feels safe in the hospital, is hoping that he can receive help with his medications to address his depressed mood, auditory hallucinations, and suicide ideations.    A: Pt presented with a flat affect, calm mood. Pt endorsed anxiety 6/10, depression 5/10, hearing voices \"telling me to to kill myself\". Pt contracts for safety while in the hospital. Pt denied pain, SI, HI, visual hallucinations. Pt was cooperative with the admission process. This is the first admission for mental illness, has been to treatment for chemical dependence several times. Pt reports using marijuana daily and meth every weekend. Also smokes 2 packs of cigarettes daily.     R: Pt will be seen by psychiatrist, CTC, and IM tomorrow.                                       "

## 2024-07-10 NOTE — PHARMACY-ADMISSION MEDICATION HISTORY
Pharmacist Admission Medication History    Admission medication history is complete. The information provided in this note is only as accurate as the sources available at the time of the update.    Information Source(s): Facility (U/NH/) medication list/MAR via N/A    Pertinent Information:   - Clonazepam: MAR lists clonazepam 10 mg at bedtime PRN.  shows clonazepam 0.5 mg last filled 6/21/24 (#30 for 30 day-supply). Updated list to 0.5 mg, as this is what is being dispensed.   - No PRNs charted as given in the month of July     Changes made to PTA medication list:  Added: tizanidine PRN   Deleted: None  Changed:   Clonazepam 1 mg -> 0.5 mg (see above)     Allergies reviewed with patient and updates made in EHR: yes    Medication History Completed By: LEVY PRESCOTT RPH 7/10/2024 2:24 PM    PTA Med List   Medication Sig Last Dose    ARIPiprazole (ABILIFY) 10 MG tablet Take 10 mg by mouth daily 7/8/2024 at AM    chlorproMAZINE (THORAZINE) 25 MG tablet Take 50 mg by mouth at bedtime 7/7/2024 at PM    clonazePAM (KLONOPIN) 0.5 MG tablet Take 1 mg by mouth nightly as needed for anxiety Unknown    gabapentin (NEURONTIN) 600 MG tablet Take 600 mg by mouth at bedtime 7/7/2024 at PM    gabapentin (NEURONTIN) 600 MG tablet Take 600 mg by mouth 2 times daily as needed for other (anxiety.) Unknown    OLANZapine (ZYPREXA) 5 MG tablet Take 5 mg by mouth 2 times daily as needed (anxiety, agitation) Unknown    tiZANidine (ZANAFLEX) 4 MG tablet Take 4 mg by mouth every 6 hours as needed for muscle spasms Unknown
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
## 2024-07-10 NOTE — PLAN OF CARE
Team Note Due:  Wednesday    Assessment/Intervention/Current Symtoms and Care Coordination:  Chart review and met with team, discussed pt progress, symptomology, and response to treatment.  Discussed the discharge plan and any potential impediments to discharge.    - Writer introduced self to pt and role in his care. Pt stated having a  named Ada that was part of his CADI waiver. He gave writer permission to call [facility] to inquire about his  status. Writer was able to confirm that pt has an active CADI waiver through [facility]. Her name is Ada and contact information is listed below. Writer went back to pt and provided him with her contact information and he signed an NEERAJ for her. Writer asked if pt had any questions and he stated not at the moment. Tentatively discussed what some of his outpatient needs might be. He reported he would be interested in therapy.     Discharge Plan or Goal:  To return to [facility]- stated in DEC assessment that he may return, CTC will confirm.     Barriers to Discharge:  Patient requires further psychiatric stabilization due to current symptomology and medication management with possible adjustments    Referral Status:  Pt reports interest in therapy     Legal Status:  Voluntary    Contacts:  [place], [facility], [place]: [phone]  No NEERAJ needed as pt was living there prior to admission.    NEERAJ: Gabriela Weston (daughter): [phone]   *NEERAJ signed on 7/9/2024 and there is electronic copy in chart review under media tab.    NEERAJ: Ada Jose Enrique/[facility] : [phone]     Upcoming Meetings and Dates/Important Information and next steps:  Contact assisted living  Contact Ada

## 2024-07-11 LAB
CHOLEST SERPL-MCNC: 215 MG/DL
HCV RNA SERPL NAA+PROBE-ACNC: ABNORMAL IU/ML
HCV RNA SERPL NAA+PROBE-LOG IU: 7.4 {LOG_IU}/ML
HDLC SERPL-MCNC: 22 MG/DL
LDLC SERPL CALC-MCNC: 130 MG/DL
NONHDLC SERPL-MCNC: 193 MG/DL
TRIGL SERPL-MCNC: 316 MG/DL

## 2024-07-11 PROCEDURE — 36415 COLL VENOUS BLD VENIPUNCTURE: CPT

## 2024-07-11 PROCEDURE — 80061 LIPID PANEL: CPT

## 2024-07-11 PROCEDURE — 124N000002 HC R&B MH UMMC

## 2024-07-11 PROCEDURE — 250N000013 HC RX MED GY IP 250 OP 250 PS 637: Performed by: PSYCHIATRY & NEUROLOGY

## 2024-07-11 PROCEDURE — 99231 SBSQ HOSP IP/OBS SF/LOW 25: CPT | Performed by: PSYCHIATRY & NEUROLOGY

## 2024-07-11 PROCEDURE — 250N000013 HC RX MED GY IP 250 OP 250 PS 637

## 2024-07-11 RX ORDER — CHLORPROMAZINE HYDROCHLORIDE 50 MG/1
50 TABLET, FILM COATED ORAL 3 TIMES DAILY PRN
Status: DISCONTINUED | OUTPATIENT
Start: 2024-07-11 | End: 2024-07-16 | Stop reason: HOSPADM

## 2024-07-11 RX ORDER — ROSUVASTATIN CALCIUM 10 MG/1
20 TABLET, COATED ORAL DAILY
Status: DISCONTINUED | OUTPATIENT
Start: 2024-07-11 | End: 2024-07-16 | Stop reason: HOSPADM

## 2024-07-11 RX ADMIN — METFORMIN HYDROCHLORIDE 500 MG: 500 TABLET, EXTENDED RELEASE ORAL at 17:19

## 2024-07-11 RX ADMIN — NICOTINE 1 PATCH: 21 PATCH, EXTENDED RELEASE TRANSDERMAL at 07:56

## 2024-07-11 RX ADMIN — CLONAZEPAM 1 MG: 0.5 TABLET ORAL at 20:20

## 2024-07-11 RX ADMIN — GABAPENTIN 600 MG: 600 TABLET, FILM COATED ORAL at 21:12

## 2024-07-11 RX ADMIN — ROSUVASTATIN 20 MG: 10 TABLET, FILM COATED ORAL at 14:23

## 2024-07-11 RX ADMIN — GABAPENTIN 600 MG: 600 TABLET, FILM COATED ORAL at 17:51

## 2024-07-11 RX ADMIN — NICOTINE POLACRILEX 4 MG: 4 GUM, CHEWING BUCCAL at 17:52

## 2024-07-11 RX ADMIN — Medication 15 MG: at 07:56

## 2024-07-11 RX ADMIN — CHLORPROMAZINE HYDROCHLORIDE 50 MG: 50 TABLET, FILM COATED ORAL at 21:12

## 2024-07-11 ASSESSMENT — ACTIVITIES OF DAILY LIVING (ADL)
ADLS_ACUITY_SCORE: 30
LAUNDRY: WITH SUPERVISION
ADLS_ACUITY_SCORE: 30
HYGIENE/GROOMING: INDEPENDENT
DRESS: STREET CLOTHES;SCRUBS (BEHAVIORAL HEALTH);INDEPENDENT
ORAL_HYGIENE: INDEPENDENT
ORAL_HYGIENE: INDEPENDENT
ADLS_ACUITY_SCORE: 30
ADLS_ACUITY_SCORE: 30
HYGIENE/GROOMING: INDEPENDENT
ADLS_ACUITY_SCORE: 30

## 2024-07-11 NOTE — PLAN OF CARE
Initial meeting note:    Therapist introduced self to patient and discussed psychotherapy service available to patient.     Pt response: Pt expressed interest in meeting with therapist    Plan: Made plan to meet with pt again; began identifying goals

## 2024-07-11 NOTE — PLAN OF CARE
BEH IP Unit Acuity Rating Score (UARS)  Patient is given one point for every criteria they meet.    CRITERIA SCORING   On a 72 hour hold, court hold, committed, stay of commitment, or revocation. 0    Patient LOS on BEH unit exceeds 20 days. 0  LOS: 2   Patient under guardianship, 55+, otherwise medically complex, or under age 11. 1   Suicide ideation without relief of precipitating factors. 1   Current plan for suicide. 0   Current plan for homicide. 0   Imminent risk or actual attempt to seriously harm another without relief of factors precipitating the attempt. 0   Severe dysfunction in daily living (ex: complete neglect for self care, extreme disruption in vegetative function, extreme deterioration in social interactions). 1   Recent (last 7 days) or current physical aggression in the ED or on unit. 0   Restraints or seclusion episode in past 72 hours. 0   Recent (last 7 days) or current verbal aggression, agitation, yelling, etc., while in the ED or unit. 0   Active psychosis. 0   Need for constant or near constant redirection (from leaving, from others, etc).  0   Intrusive or disruptive behaviors. 0   Patient requires 3 or more hours of individualized nursing care per 8-hour shift (i.e. for ADLs, meds, therapeutic interventions). 0   TOTAL 3

## 2024-07-11 NOTE — PROGRESS NOTES
CLINICAL NUTRITION SERVICES    Reason for Assessment:  Nutrition education regarding carb counting     Diet History:  Pt reports no history of receiving education on carb counting in the past.     Nutrition Diagnosis:  Food- and nutrition-related knowledge deficit r/t no previous knowledge of carb counting AEB pt report of not having received carb counting nutrition education in the past.      Interventions:  Nutrition Education:Survival Information  1)  Provided verbal and written nutrition on diabetes meal planning and importance of consistent carbohydrate intake to optimize glycemic control.  3)  Handouts provided: Carb Counting handout Plate Method for Diabetes.     Goals:   Patient will verbalize at least one food choice (along with the appropriate portion size) in each of the food groups that contain 1 carb unit.      Follow-up:    Patient to ask any further nutrition-related questions before discharge.  In addition, pt may request outpatient RD appointment.    Marilou Prakash MS, RDN, LD  TCU/OB/Ortho Clinical Dietitian  Available via phone and Vocera  Phone: [phone]  Vocera: TCU Clinical Dietitian  Weekend/Holiday Vocera: Weekend Holiday Clinical Dietitian [Multi Site Groups]

## 2024-07-11 NOTE — PLAN OF CARE
Goal Outcome Evaluation:    Plan of Care Reviewed With: patient          Nursing Assessment    Schizoaffective DO  Schizoaffective disorder, chronic condition with acute exacerbation (H)    Admit Date: 7/9/2024    Length of Stay: 2    Patient evaluation continues. Assessed mood,anxiety,thoughts and behavior. Patient is progressing towards goals. Patient is encouraged to participate in groups and assisted to develop healthy coping skills.  Patient admits to soft auditory  hallucinations not able to identify. /86 (BP Location: Right arm)   Pulse 98   Temp 97.4  F (36.3  C) (Temporal)   SpO2 98%     Mood: labile     Patient reports depression 4/10 and reports anxiety 6/10    Affect: tense at times, easily agitated    Sleep: good    Appetite: good    SI: denies    HI: denies    SIB: denies      Medication Compliance yes    Group participation:no    ADL's: independent    Fall risk interventions: none    Discharge planning in process    Refer to daily team meeting notes for individualized plan of care. Nursing will continue to assess.    *Scale is 1-10 and 10 is the worst.

## 2024-07-11 NOTE — PLAN OF CARE
Pt has not attended scheduled occupational therapy sessions. Encourage attendance and participation.     07/11/24 1100   General Information   Has Not Attended OT as of: 07/11/24

## 2024-07-11 NOTE — PLAN OF CARE
Problem: Sleep Disturbance  Goal: Adequate Sleep/Rest  Outcome: Progressing   Goal Outcome Evaluation:       Patient is sleeping soundly in the beginning of the shift.He is in a side-lying position. No complaints made. Nothing unusual noted. He had an uninterrupted sleep for 9.5 hours the whole night.

## 2024-07-11 NOTE — PROGRESS NOTES
Brief Medicine Follow Up Note    Following up regarding fasting lipid panel and primary cardiac prevention w/ diabetes.     Today's vital signs, medications, and nursing notes were reviewed. Labs reviewed most recent serum and urine laboratories.     /86 (BP Location: Right arm)   Pulse 98   Temp 97.4  F (36.3  C) (Temporal)   SpO2 98%     A/P:  Type 2 Diabetes Mellitus, non-insulin-dependent  Please see my consult noted dated 7/10/24 for more details regarding diabetes, which has been uncontrolled. A1c 7.6% in January and lost to follow-up, was 9.6% here. Started on low-dose Metformin on 7/10.   - Continue Metformin 500mg w/ supper. Will begin to uptitrate dose in one week (on 7/17/24, by 500mg increments). Next dose increase to 500mg qAM + 500mg w/ dinner. Please monitor for any side effects (primarily GI upset; n/v, diarrhea, abd pain/cramping)  - Nutrition consulted for optimization of diet  - Ordered CHO moderate diet   - Mgmt of lipids & primary cardiac prevention as below  - Repeat A1c in 3 months  - Follow-up closely w/ PCP for ongoing titration of Metformin as tolerated    HLD  Fasting Lipid Panel obtained as part of a general diabetes workup. This showed elevated cholesterol, trigs, and LDL, and low HDL. ASCVD risk stratification based on these values, his age, smoking status, all yield a 35.4% risk of a major cardiovascular event in the next 10 years. Thus, per UpToDate, should be started on high-intensity statin therapy.  - Will initiate Rosuvastatin 20mg daily as LFTs are WNL. Monitor for side effects such as myalgias   - Close follow-up w/ PCP to bridge care as an OP  - Recheck LFTs in 3 months as an OP  - Recheck Lipid Panel in 4-12 weeks as an OP  ___________________________    ADDENDUM:  I discussed all the above with the patient who is in agreement with the plan. He acknowledges the side effects of the above medications and opts to continue with therapy for now and to watch out for any  side effects. I discussed w/ him importance of adherence to meds and close follow-up w/ PCP as an OP, and he will contact his clinic at discharge to arrange follow-up ([facility]).  ____________________________    Medicine will continue to follow along peripherally while here to uptitrate his Metformin. Please notify Medicine upon discharge so we can discuss plan with Primary Team for follow-up as an OP. He will need close follow-up w/ PCP. Recommendations relayed to primary team via this progress note.  Thank you for the opportunity to be involved in this patient's care.    Carl Smith PA-C  [facility]  Contact information available via [facility] Paging/Directory

## 2024-07-11 NOTE — PROGRESS NOTES
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 condition with acute exacerbation (H)              Assessment:     Patient with diagnosis of schizoaffective disorder complicated by methamphetamine dependence, now presents with worsening mood and suicidal thoughts in the context of meth use and poor medication compliance.          Plan:     Legal:  Voluntary    Medication:  Will continue Abilify and dose increased to 15 mg a day. Continue Thorazine for now. Continue Gabapentin. Assess mood as patient has more days clean from methamphetamine which he last used 3 days ago    Consults: Hospitalist will be consulted if medical issues arise      Multidisciplinary Interventions:  to gather collateral information, coordinate care with outpatient providers and begin follow up planning      Disposition: home with follow up. Assess options for CD treatment      No further change in treatment plan  Patient seen, chart reviewed, case reviewed with  and with nursing.   Case reviewed in multi-disciplinary treatment team.      Hemant Hobbs MD

## 2024-07-11 NOTE — PLAN OF CARE
Problem: Suicide Risk  Goal: Absence of Self-Harm  Outcome: Progressing  Intervention: Assess Risk to Self and Maintain Safety    Problem: Anxiety Signs/Symptoms  Goal: Optimized Cognitive Function (Anxiety Signs/Symptoms)  Outcome: Progressing     Problem: Depressive Symptoms  Goal: Depressive Symptoms  Description: Signs and symptoms of listed problems will be absent or manageable.  Outcome: Progressing     Patient in room sleeping at the beginning of the shift, up for meals, patient presented with flat affect but brightens up on approach, patient endorse anxiety and depression at 6, PRN gabapentin given per patient's request with effects per patient. Patient denies SI/SIB/hallucination, kush for safety, safety checks and precautions in place. Patient is eating and drinking adequately. Patient attended and participated in evening group, medication compliant, no side effects of medications. No behavioral or safety concerns, will continue to monitor and offer therapeutic support as needed for the rest of the shift.    /86 (BP Location: Right arm)   Pulse 98   Temp 97.4  F (36.3  C) (Temporal)   SpO2 98%

## 2024-07-12 LAB — GLUCOSE BLDC GLUCOMTR-MCNC: 148 MG/DL (ref 70–99)

## 2024-07-12 PROCEDURE — 90832 PSYTX W PT 30 MINUTES: CPT

## 2024-07-12 PROCEDURE — 250N000013 HC RX MED GY IP 250 OP 250 PS 637: Performed by: PSYCHIATRY & NEUROLOGY

## 2024-07-12 PROCEDURE — 99233 SBSQ HOSP IP/OBS HIGH 50: CPT | Performed by: PSYCHIATRY & NEUROLOGY

## 2024-07-12 PROCEDURE — 250N000013 HC RX MED GY IP 250 OP 250 PS 637

## 2024-07-12 PROCEDURE — 124N000002 HC R&B MH UMMC

## 2024-07-12 RX ADMIN — GABAPENTIN 600 MG: 600 TABLET, FILM COATED ORAL at 19:50

## 2024-07-12 RX ADMIN — CLONAZEPAM 1 MG: 0.5 TABLET ORAL at 19:49

## 2024-07-12 RX ADMIN — Medication 15 MG: at 08:27

## 2024-07-12 RX ADMIN — HYDROXYZINE HYDROCHLORIDE 25 MG: 25 TABLET, FILM COATED ORAL at 17:46

## 2024-07-12 RX ADMIN — NICOTINE POLACRILEX 4 MG: 4 GUM, CHEWING BUCCAL at 17:46

## 2024-07-12 RX ADMIN — NICOTINE 1 PATCH: 21 PATCH, EXTENDED RELEASE TRANSDERMAL at 08:26

## 2024-07-12 RX ADMIN — METFORMIN HYDROCHLORIDE 500 MG: 500 TABLET, EXTENDED RELEASE ORAL at 17:22

## 2024-07-12 RX ADMIN — ROSUVASTATIN 20 MG: 10 TABLET, FILM COATED ORAL at 08:28

## 2024-07-12 RX ADMIN — ACETAMINOPHEN 650 MG: 325 TABLET, FILM COATED ORAL at 11:36

## 2024-07-12 ASSESSMENT — ACTIVITIES OF DAILY LIVING (ADL)
ORAL_HYGIENE: INDEPENDENT
ADLS_ACUITY_SCORE: 30
DRESS: STREET CLOTHES;INDEPENDENT
ADLS_ACUITY_SCORE: 30
HYGIENE/GROOMING: INDEPENDENT
ADLS_ACUITY_SCORE: 30
LAUNDRY: WITH SUPERVISION
ADLS_ACUITY_SCORE: 30

## 2024-07-12 NOTE — PLAN OF CARE
Team Note Due:  Wednesday    Assessment/Intervention/Current Symtoms and Care Coordination:  Chart review and met with team, discussed pt progress, symptomology, and response to treatment.  Discussed the discharge plan and any potential impediments to discharge.    - Per nursing report, pt's irritability appears to have resolved and he appears more calm and cooperative. Pt is still endorsing hallucinations. Pt will remain over weekend.    - Per unit therapist, pt engaged in therapy session with her. Reported he was interested in continuing therapy on an OP basis.     - Writer left message for pt's CADI  dAa. Provided contact information.    - Writer called and spoke to pt's nurse Cora at his assisted living. He confirmed pt can return. Writer provided brief update.    Discharge Plan or Goal:  To return to [facility]- stated in DEC assessment that he may return, CTC will confirm.     Barriers to Discharge:  Patient requires further psychiatric stabilization due to current symptomology and medication management with possible adjustments    Referral Status:  Pt reports interest in therapy     Legal Status:  Voluntary    Contacts:  Cora, [facility], [place]: [phone]  No NEERAJ needed as pt was living there prior to admission.    NEERAJ: Gabriela Weston (daughter): [phone]   *NEERAJ signed on 7/9/2024 and there is electronic copy in chart review under media tab.    NEERAJ: Ada Quispe/[facility] : [phone]     Upcoming Meetings and Dates/Important Information and next steps:  Referrals for therapy

## 2024-07-12 NOTE — PLAN OF CARE
Goal Outcome Evaluation:    Plan of Care Reviewed With: patient        Nursing Assessment    Schizoaffective DO  Schizoaffective disorder, chronic condition with acute exacerbation (H)    Admit Date: 7/9/2024    Length of Stay: 3    Patient evaluation continues. Assessed mood,anxiety,thoughts and behavior. Patient is progressing towards goals. Patient is encouraged to participate in groups and assisted to develop healthy coping skills. Pt is mostly out for meals and then retreats back to his room. Pt denies suicidally. Patient admits to soft auditory hallucinations. /78   Pulse 86   Temp 98  F (36.7  C) (Temporal)   Resp 16   SpO2 92%     Mood: better per pt     Patient reports depression 4/10 and reports anxiety 3/10    Affect:flat but brightens upon approach    Sleep: good    Appetite: good    SI: denies    HI: denies    SIB: denies      Medication Compliance yes    Group participation:no    ADL's: independent    Fall risk interventions: none    Discharge planning in process    Refer to daily team meeting notes for individualized plan of care. Nursing will continue to assess.    *Scale is 1-10 and 10 is the worst.
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
## 2024-07-12 NOTE — PLAN OF CARE
"Individual Therapy Note      Date of Service: 2024    Patient: Benitez goes by \"Benitez,\" uses he/him pronouns    Individuals Present: Benitez Susana Guerra [facility]    Session start: 1100  Session end: 1130  Session duration in minutes: 30      Modality Used: CBT, Person Centered, and Rapport Building    Goals: Reduce depression    Patient Description of current symptoms: voices     Mental Status Exam:   Attitude: cooperative  Eye Contact: fair  Mood: sad  and depressed  Affect: intensity is blunted and intensity is flat  Speech: clear, coherent  Psychomotor Behavior: no evidence of tardive dyskinesia, dystonia, or tics  Thought Process:  logical  Associations: no loose associations  Thought Content: no evidence of suicidal ideation or homicidal ideation  Insight: good  Judgement: intact  Attention Span and Concentration: intact    Pt progress: Patient reported he is hearing voices from meth use, but the medication is helping. He reported that he was totally off his meds and doing meth. He goes off his meds because he doesn't feel like himself when on them. They slow him down which ws hard when he was working. He is trying to get on disability. He reports he has been using meth on and off since age 16. He uses drugs because he doesn't like himself. He will initially feel good about himself when he uses meth until it takes its toll. He reports that he loses muscle mass and loses his mind and does stupid stuff like stealing and fighting. He feels bad about the people he has hurt and things he has done. He reported he was sober for 6 years and relapsed when his son Shantanu  from fentanyl. Patient cried while talking about his son. He reported his son was his best friend. They hunted and fished together and partied together. When hsi son  he lost the will to live. Patient has 5 kids and reports that the other kids resent him. He admits he wasn't a good dad. He wasn't there when they needed him the most. He was in " "and out of [employer] while they were growing up. He was strict and they hated him for that. Shantanu was the only one who wanted to hang out with him when the kids became adults. Patient reported his own upbringing was difficult. He was raised by a single mom and only met his dad once when he was 11. Mom was an alcoholic, bipolar, harsh and mean. Mom brought a lot of men home from the bars and made the kids call them dad. When she was on the fourth relationship he refused to call any more of her men \"dad.\" Some of them were abusive. Patient has felt anger and hatred most of his life. We discussed his strengths and coping skills. Patient reported his strengths are hard working and caring. Working out and music have been coping skills.    Treatment Objective(s) Addressed:   The focus of this session was on rapport building, orienting the patient to therapy, and processing feelings related to grief and loss      Progress Towards Goals and Assessment of Patient:   Patient is making progress towards treatment goals as evidenced by engaging in therapy.       Therapeutic Intervention(s):   Provided active listening, unconditional positive regard, and validation.     Plan/next step: Meet next week    84567 - Psychotherapy (with patient) - 30 (16-37*) min    Patient Active Problem List   Diagnosis    Suicide attempt by drug ingestion (H)    Hepatitis C antibody test positive    Suicidal ideation    Suicide attempt (H)    Polysubstance abuse (H)    Schizoaffective disorder, bipolar type (H)    Schizoaffective disorder, chronic condition with acute exacerbation (H)                            "

## 2024-07-12 NOTE — PLAN OF CARE
BEH IP Unit Acuity Rating Score (UARS)  Patient is given one point for every criteria they meet.    CRITERIA SCORING   On a 72 hour hold, court hold, committed, stay of commitment, or revocation. 0    Patient LOS on BEH unit exceeds 20 days. 0  LOS: 3   Patient under guardianship, 55+, otherwise medically complex, or under age 11. 1   Suicide ideation without relief of precipitating factors. 1   Current plan for suicide. 0   Current plan for homicide. 0   Imminent risk or actual attempt to seriously harm another without relief of factors precipitating the attempt. 0   Severe dysfunction in daily living (ex: complete neglect for self care, extreme disruption in vegetative function, extreme deterioration in social interactions). 1   Recent (last 7 days) or current physical aggression in the ED or on unit. 0   Restraints or seclusion episode in past 72 hours. 0   Recent (last 7 days) or current verbal aggression, agitation, yelling, etc., while in the ED or unit. 0   Active psychosis. 0   Need for constant or near constant redirection (from leaving, from others, etc).  0   Intrusive or disruptive behaviors. 0   Patient requires 3 or more hours of individualized nursing care per 8-hour shift (i.e. for ADLs, meds, therapeutic interventions). 0   TOTAL 3

## 2024-07-12 NOTE — PLAN OF CARE
Problem: Sleep Disturbance  Goal: Adequate Sleep/Rest  Outcome: Progressing   Goal Outcome Evaluation:       Patient is sleeping well at the start of the shift. No complaints made. Nothing unusual noted. Patient had uninterrupted sleep for 9.25 hours the whole shift.

## 2024-07-13 LAB — GLUCOSE BLDC GLUCOMTR-MCNC: 156 MG/DL (ref 70–99)

## 2024-07-13 PROCEDURE — 250N000013 HC RX MED GY IP 250 OP 250 PS 637

## 2024-07-13 PROCEDURE — 250N000013 HC RX MED GY IP 250 OP 250 PS 637: Performed by: PSYCHIATRY & NEUROLOGY

## 2024-07-13 PROCEDURE — 124N000002 HC R&B MH UMMC

## 2024-07-13 RX ADMIN — CLONAZEPAM 1 MG: 0.5 TABLET ORAL at 22:24

## 2024-07-13 RX ADMIN — Medication 15 MG: at 08:15

## 2024-07-13 RX ADMIN — GABAPENTIN 600 MG: 600 TABLET, FILM COATED ORAL at 15:59

## 2024-07-13 RX ADMIN — CHLORPROMAZINE HYDROCHLORIDE 50 MG: 50 TABLET, FILM COATED ORAL at 22:24

## 2024-07-13 RX ADMIN — NICOTINE POLACRILEX 4 MG: 4 GUM, CHEWING BUCCAL at 18:01

## 2024-07-13 RX ADMIN — GABAPENTIN 600 MG: 600 TABLET, FILM COATED ORAL at 22:25

## 2024-07-13 RX ADMIN — ROSUVASTATIN 20 MG: 10 TABLET, FILM COATED ORAL at 08:15

## 2024-07-13 RX ADMIN — NICOTINE 1 PATCH: 21 PATCH, EXTENDED RELEASE TRANSDERMAL at 08:16

## 2024-07-13 RX ADMIN — METFORMIN HYDROCHLORIDE 500 MG: 500 TABLET, EXTENDED RELEASE ORAL at 16:00

## 2024-07-13 ASSESSMENT — ACTIVITIES OF DAILY LIVING (ADL)
ADLS_ACUITY_SCORE: 30
HYGIENE/GROOMING: INDEPENDENT
DRESS: INDEPENDENT
ADLS_ACUITY_SCORE: 30

## 2024-07-13 NOTE — PLAN OF CARE
Problem: Adult Behavioral Health Plan of Care  Goal: Plan of Care Review  Flowsheets  Taken 7/12/2024 220  Plan of Care Reviewed With: patient  Overall Patient Progress: no change  Patient Agreement with Plan of Care: agrees    Patient was resting in bed and was also visible at the milieu this shift. Flat affect. Endorsed high anxiety and moderate depression. Stated he has auditory hallucinations, voices telling him to kill himself. He contracted for safety here at the hospital. He ate 100% at supper. Took all his medications. PRN medications given were Hydroxyzine 25 mg at 1746 and Klonopin 1 mg at 1949. His daughter, Gabriela, placed a call and requested to be called by the primary doctor for updates. Gabriela has kids below 12 yrs old and was requesting if she can bring with them during visiting hours. Writer explained to pt's daughter and to pt about the hospital/unit policy. Pt's daughter wanted to bring food from home but writer told her that it's not allowed and patient was eating 100% during meals so far. Pt's daughter was also requesting if she could bring diet soda and flavored yogurt. Nursing to obtain an order from primary doctor tomorrow morning.

## 2024-07-13 NOTE — PLAN OF CARE
Problem: Sleep Disturbance  Goal: Adequate Sleep/Rest  Outcome: Progressing   Goal Outcome Evaluation:       Patient is sleeping soundly at the start of the shift. No complaints made. Nothing unusual noted. She slept the whole night for 9 hours.

## 2024-07-14 LAB
GLUCOSE BLDC GLUCOMTR-MCNC: 145 MG/DL (ref 70–99)
GLUCOSE BLDC GLUCOMTR-MCNC: 197 MG/DL (ref 70–99)

## 2024-07-14 PROCEDURE — H2032 ACTIVITY THERAPY, PER 15 MIN: HCPCS | Performed by: PSYCHOLOGIST

## 2024-07-14 PROCEDURE — 250N000013 HC RX MED GY IP 250 OP 250 PS 637: Performed by: PSYCHIATRY & NEUROLOGY

## 2024-07-14 PROCEDURE — 250N000013 HC RX MED GY IP 250 OP 250 PS 637

## 2024-07-14 PROCEDURE — 124N000002 HC R&B MH UMMC

## 2024-07-14 RX ADMIN — NICOTINE 1 PATCH: 21 PATCH, EXTENDED RELEASE TRANSDERMAL at 08:47

## 2024-07-14 RX ADMIN — GABAPENTIN 600 MG: 600 TABLET, FILM COATED ORAL at 20:29

## 2024-07-14 RX ADMIN — CHLORPROMAZINE HYDROCHLORIDE 50 MG: 50 TABLET, FILM COATED ORAL at 20:28

## 2024-07-14 RX ADMIN — METFORMIN HYDROCHLORIDE 500 MG: 500 TABLET, EXTENDED RELEASE ORAL at 16:50

## 2024-07-14 RX ADMIN — Medication 15 MG: at 08:46

## 2024-07-14 RX ADMIN — ROSUVASTATIN 20 MG: 10 TABLET, FILM COATED ORAL at 08:47

## 2024-07-14 RX ADMIN — GABAPENTIN 600 MG: 600 TABLET, FILM COATED ORAL at 08:46

## 2024-07-14 RX ADMIN — CLONAZEPAM 1 MG: 0.5 TABLET ORAL at 20:33

## 2024-07-14 ASSESSMENT — ACTIVITIES OF DAILY LIVING (ADL)
ADLS_ACUITY_SCORE: 30
HYGIENE/GROOMING: INDEPENDENT

## 2024-07-14 NOTE — PLAN OF CARE
Goal Outcome Evaluation:    Plan of Care Reviewed With: patient        Benitez has spent most of the day in room, but could occasionally come out in the milieu and watch TV with other peers. He was medicated for anxiety with multiple prns with relief stated. He denied SI,HI and he contracted for safety. He was medication compliant and he denied side effects. Pt had adequate intake of both meals. He offered no other complaints. Will continue with plan of care.

## 2024-07-14 NOTE — PLAN OF CARE
"  Problem: Suicide Risk  Goal: Absence of Self-Harm  Outcome: Progressing   Goal Outcome Evaluation:       Patient spent most of the shift lying in bed. Patient reported that he doesn't feel well \"in his head\", meaning his depression. Patient endorses high depression and anxiety, and endorses passive SI thoughts, or wishing to be dead, but denies any plan or intent. Patient endorsed audio hallucinations today, but reported he was unable to understand what they were saying. Patient denies visual hallucinations, and denies HI. Patient reported eating 75% of breakfast, and 100% of lunch. Patient had PRN gabapentin for anxiety with his AM medications which he reported was effective. Patient's blood sugar was not checked until about 1430, which was 2 hours after eating, and result was 197.                 "

## 2024-07-14 NOTE — PLAN OF CARE
Problem: Sleep Disturbance  Goal: Adequate Sleep/Rest  Outcome: Progressing   Goal Outcome Evaluation:       Patient is sleeping comfortably on a side lying position. No complaints made. Nothing unusual noted. He slept the whole night for 9.5 hours without interruptions.

## 2024-07-15 LAB
GLUCOSE BLDC GLUCOMTR-MCNC: 138 MG/DL (ref 70–99)
GLUCOSE BLDC GLUCOMTR-MCNC: 152 MG/DL (ref 70–99)

## 2024-07-15 PROCEDURE — 124N000002 HC R&B MH UMMC

## 2024-07-15 PROCEDURE — H2032 ACTIVITY THERAPY, PER 15 MIN: HCPCS | Performed by: PSYCHOLOGIST

## 2024-07-15 PROCEDURE — 99233 SBSQ HOSP IP/OBS HIGH 50: CPT | Performed by: PSYCHIATRY & NEUROLOGY

## 2024-07-15 PROCEDURE — 250N000013 HC RX MED GY IP 250 OP 250 PS 637

## 2024-07-15 PROCEDURE — 250N000013 HC RX MED GY IP 250 OP 250 PS 637: Performed by: PSYCHIATRY & NEUROLOGY

## 2024-07-15 RX ADMIN — CHLORPROMAZINE HYDROCHLORIDE 50 MG: 50 TABLET, FILM COATED ORAL at 20:32

## 2024-07-15 RX ADMIN — ROSUVASTATIN 20 MG: 10 TABLET, FILM COATED ORAL at 08:20

## 2024-07-15 RX ADMIN — METFORMIN HYDROCHLORIDE 500 MG: 500 TABLET, EXTENDED RELEASE ORAL at 16:37

## 2024-07-15 RX ADMIN — GABAPENTIN 600 MG: 600 TABLET, FILM COATED ORAL at 20:33

## 2024-07-15 RX ADMIN — POLYETHYLENE GLYCOL 3350 17 G: 17 POWDER, FOR SOLUTION ORAL at 14:39

## 2024-07-15 RX ADMIN — NICOTINE 1 PATCH: 21 PATCH, EXTENDED RELEASE TRANSDERMAL at 08:20

## 2024-07-15 RX ADMIN — GABAPENTIN 600 MG: 600 TABLET, FILM COATED ORAL at 14:38

## 2024-07-15 RX ADMIN — CLONAZEPAM 1 MG: 0.5 TABLET ORAL at 20:41

## 2024-07-15 RX ADMIN — Medication 15 MG: at 08:20

## 2024-07-15 ASSESSMENT — ACTIVITIES OF DAILY LIVING (ADL)
ADLS_ACUITY_SCORE: 30
DRESS: INDEPENDENT
ADLS_ACUITY_SCORE: 30
ADLS_ACUITY_SCORE: 30
HYGIENE/GROOMING: INDEPENDENT
ADLS_ACUITY_SCORE: 30
LAUNDRY: WITH SUPERVISION
ADLS_ACUITY_SCORE: 30
ORAL_HYGIENE: INDEPENDENT
HYGIENE/GROOMING: INDEPENDENT
ADLS_ACUITY_SCORE: 30

## 2024-07-15 NOTE — PROGRESS NOTES
Rehab Group    Start time: 1900  End time: 1955  Patient time total: 45 minutes    attended partial group    #5 attended   Group Type: recreation   Group Topic Covered: healthy leisure time       Group Session Detail:  TR leisure group     Patient Response/Contribution:  cooperative with task, attentive, and actively engaged       Patient Detail:    Pt participated in Therapeutic Recreation group with focus on socializing, problem solving, and leisure participation. Engaged and cooperative in group recreational intervention; word puzzles. Pt participated throughout the entire the group. Pt affect was flat and mood was calm. Pt added to the group discussion during the activity, relating the discussion to each puzzle. Pt talked about what each quote meant to him.              Activity Therapy Per 15 minutes () Other Rehab therapies    Patient Active Problem List   Diagnosis    Suicide attempt by drug ingestion (H)    Hepatitis C antibody test positive    Suicidal ideation    Suicide attempt (H)    Polysubstance abuse (H)    Schizoaffective disorder, bipolar type (H)    Schizoaffective disorder, chronic condition with acute exacerbation (H)

## 2024-07-15 NOTE — PLAN OF CARE
"  Problem: Adult Behavioral Health Plan of Care  Goal: Adheres to Safety Considerations for Self and Others  Outcome: Not Progressing   Goal Outcome Evaluation:    Plan of Care Reviewed With: patient    Pt Isolative and withdrawn , did come out for meals and ate 75% of his supper . Pt is medication complaint . Pt admits he constantly hear voices and does have passive SI thoughts but contracts for safety  . Pt admits his Anxiety and depression has to do with his newly Dx of diabetes . \" I dont like it\" I like to eat what I want to eat pt told writer . Pt was educated on keeping a diet low in sugar and starch . Pt told write he will try to control his craving for candy . Plan is to continue POC                "

## 2024-07-15 NOTE — DISCHARGE INSTRUCTIONS
Behavioral Discharge Planning and Instructions    Summary: You were admitted on 7/9/2024  due to Schizoaffective disorder and Suicidal Ideations.  You were treated by Hemant Hobbs MD and discharged on 7/16/2024 from [facility]    Main Diagnosis: Schizoaffective disorder, chronic condition with acute exacerbation     Health Care Follow-up:   Medication Management Follow-up Appointment Date/Time: Monday, August 5th at 2:20pm via phone call  DNP provider: Jovana Malagon DNP/[facility] ([facility])      Phone Number: [phone]    ** Jovana will call you at the start of your appointment    Initial Therapy Appointment Date/Time: Thursday August 15th at 1pm via video call  Therapist: Cindi Medellin MA, LM/[facility] ([facility])   Phone Number: [phone]  **A link will be sent to your email day of your appointment. It's possible the link will be sent a few minutes before your appointment. If you have concerns about attending the appointment contact the clinic number to speak to a .         Information will be faxed to your outpatient providers to ensure a healthy continuity of care for you.     Attend all scheduled appointments with your outpatient providers. Call at least 24 hours in advance if you need to reschedule an appointment to ensure continued access to your outpatient providers.     Major Treatments, Procedures and Findings:  You were provided with: a psychiatric assessment, assessed for medical stability, medication evaluation and/or management, individual therapy, and milieu management    Symptoms to Report: feeling more aggressive, increased confusion, losing more sleep, mood getting worse, or thoughts of suicide    Early warning signs can include: increased depression or anxiety sleep disturbances increased thoughts or behaviors of suicide or self-harm  increased unusual  thinking, such as paranoia or hearing voices    Safety and Wellness:  Take all medicines as directed.  Make no changes unless your doctor suggests them.      Follow treatment recommendations.  Refrain from alcohol and non-prescribed drugs.  Ask your support system to help you reduce your access to items that could harm yourself or others. If there is a concern for safety, call 911.    Resources:   Mental Health Crisis Resources  Throughout [place]: call **CRISIS (**353617)  Crisis Text Line: is available for free, 24/7 by texting [place] to 214655  Suicide Awareness Voices of Education (SAVE) (www.save.org): 411-176-KJHZ (4046)  The [place] Suicide Prevention Lifeline is now: 988 Suicide and Crisis Lifeline. Call 988 anytime.  National [place] on Mental Illness (www.[place].justin.org): 163.710.9039 or 004-111-9394.  Ubnl8tevh: text the word LIFE to 88825 for immediate support and crisis intervention  Mental Health Consumer/Survivor Network of [place] (www.mhcsn.net): 751.240.7964 or 152-350-4494  Mental Health Association of [place] (www.mentalhealth.org): 291.366.8104 or 589-999-0365  Peer Support Connection [place] Warmline (PSC) 1-802.243.6337 Available from 5pm - 9am (7 days a week/365 days a year)  [facility] 1-192.702.1654 Community Outreach for Psych Emergencies      General Medication Instructions:   See your medication sheet(s) for instructions.   Take all medicines as directed.  Make no changes unless your doctor suggests them.   Go to all your doctor visits.  Be sure to have all your required lab tests. This way, your medicines can be refilled on time.  Do not use any drugs not prescribed by your doctor.  Avoid alcohol.    Advance Directives:   Scanned document on file with [facility]? No scanned doc  Is document scanned? Pt states no documents  Honoring Choices Your Rights Handout: Minor - N/A  Was more information offered? Pt declined    The Treatment team has appreciated the opportunity to work with you. If you have any  questions or concerns about your recent admission, you can contact the unit which can receive your call 24 hours a day, 7 days a week. They will be able to get in touch with a Provider if needed. The unit number is [phone] .

## 2024-07-15 NOTE — PLAN OF CARE
Team Note Due:  Wednesday    Assessment/Intervention/Current Symtoms and Care Coordination:  Chart review and met with team, discussed pt progress, symptomology, and response to treatment.  Discussed the discharge plan and any potential impediments to discharge.    - Per nursing report, pt appears more stable and he reports symptoms have decreased. Potentially planning for pt to discharge back to [facility] tomorrow.    - Writer called and spoke to Abdirizak nurse at pt's [facility]. Writer informed him that we were planning to discharge pt tomorrow. Confirmed they use [facility] in [place] as their pharmacy. He stated it didn't matter what time pt discharged tomorrow. Writer called provider to let him know what pharmacy to send discharge medications to.    - Writer met with pt. Confirmed that he was still interested in therapy and got his provider preferences. Writer contacted [facility] and scheduled a follow up appt with his medication provider and scheduled an initial appt for therapy. AVS updated.    - Writer informed pt of the appts that were scheduled for him and that writer spoke to his [facility]. Writer stated that we will check in again in the morning and writer will call a cab for him once discharge time has been set.    Discharge Plan or Goal:  To return to [facility], pt is discharging on 7/16.     Barriers to Discharge:  Patient requires further psychiatric stabilization due to current symptomology and medication management with possible adjustments    Referral Status:  Pt was already involved with [facility] for medication management services. Writer set up therapy for pt as he requested through same clinic. See AVS for more information.     Legal Status:  Voluntary    Contacts:  [facility], [facility], [place]: [phone]  No NEERAJ needed as pt was living there prior to admission.    NEERAJ: Gabriela Weston (daughter): [phone]   *NEERAJ signed on 7/9/2024 and there is electronic copy in chart review under media tab.    NEERAJ: Ada Quispe/Jerry  [facility] : [phone]     Upcoming Meetings and Dates/Important Information and next steps:  Let [facility] know about discharge  Schedule discharge cab

## 2024-07-15 NOTE — PLAN OF CARE
Problem: Anxiety Signs/Symptoms  Goal: Optimized Energy Level (Anxiety Signs/Symptoms)  Outcome: Progressing   Goal Outcome Evaluation:    Plan of Care Reviewed With: patient      Patient has been calm, isolative and withdrawn from peers. He came out for meals and is medication complaint.  Patient rates anxiety at 6/10, depression at 5/10, denies suicidal ideations. Patient reported constipation, PRN Vannessa lax was administered per request. Pt states he feels ready to leave and is hoping to discharge tomorrow.
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
## 2024-07-15 NOTE — PLAN OF CARE
BEH IP Unit Acuity Rating Score (UARS)  Patient is given one point for every criteria they meet.    CRITERIA SCORING   On a 72 hour hold, court hold, committed, stay of commitment, or revocation. 0    Patient LOS on BEH unit exceeds 20 days. 0  LOS: 6   Patient under guardianship, 55+, otherwise medically complex, or under age 11. 1   Suicide ideation without relief of precipitating factors. 1   Current plan for suicide. 0   Current plan for homicide. 0   Imminent risk or actual attempt to seriously harm another without relief of factors precipitating the attempt. 0   Severe dysfunction in daily living (ex: complete neglect for self care, extreme disruption in vegetative function, extreme deterioration in social interactions). 0   Recent (last 7 days) or current physical aggression in the ED or on unit. 0   Restraints or seclusion episode in past 72 hours. 0   Recent (last 7 days) or current verbal aggression, agitation, yelling, etc., while in the ED or unit. 0   Active psychosis. 1   Need for constant or near constant redirection (from leaving, from others, etc).  0   Intrusive or disruptive behaviors. 0   Patient requires 3 or more hours of individualized nursing care per 8-hour shift (i.e. for ADLs, meds, therapeutic interventions). 0   TOTAL 3

## 2024-07-16 VITALS
WEIGHT: 210.76 LBS | DIASTOLIC BLOOD PRESSURE: 74 MMHG | HEART RATE: 94 BPM | TEMPERATURE: 97.5 F | SYSTOLIC BLOOD PRESSURE: 113 MMHG | BODY MASS INDEX: 31.12 KG/M2 | OXYGEN SATURATION: 98 % | RESPIRATION RATE: 16 BRPM

## 2024-07-16 LAB — GLUCOSE BLDC GLUCOMTR-MCNC: 168 MG/DL (ref 70–99)

## 2024-07-16 PROCEDURE — 250N000013 HC RX MED GY IP 250 OP 250 PS 637: Performed by: PSYCHIATRY & NEUROLOGY

## 2024-07-16 PROCEDURE — 250N000013 HC RX MED GY IP 250 OP 250 PS 637

## 2024-07-16 PROCEDURE — 99239 HOSP IP/OBS DSCHRG MGMT >30: CPT | Performed by: PSYCHIATRY & NEUROLOGY

## 2024-07-16 RX ORDER — METFORMIN HCL 500 MG
500 TABLET, EXTENDED RELEASE 24 HR ORAL
Qty: 30 TABLET | Refills: 1 | Status: SHIPPED | OUTPATIENT
Start: 2024-07-16

## 2024-07-16 RX ORDER — GABAPENTIN 600 MG/1
600 TABLET ORAL AT BEDTIME
Qty: 30 TABLET | Refills: 1 | Status: SHIPPED | OUTPATIENT
Start: 2024-07-16

## 2024-07-16 RX ORDER — CHLORPROMAZINE HYDROCHLORIDE 25 MG/1
50 TABLET, FILM COATED ORAL AT BEDTIME
Qty: 60 TABLET | Refills: 1 | Status: SHIPPED | OUTPATIENT
Start: 2024-07-16

## 2024-07-16 RX ORDER — CLONAZEPAM 0.5 MG/1
1 TABLET ORAL
Qty: 60 TABLET | Refills: 1 | Status: SHIPPED | OUTPATIENT
Start: 2024-07-16

## 2024-07-16 RX ORDER — ARIPIPRAZOLE 15 MG/1
15 TABLET ORAL DAILY
Qty: 30 TABLET | Refills: 1 | Status: SHIPPED | OUTPATIENT
Start: 2024-07-16

## 2024-07-16 RX ORDER — ROSUVASTATIN CALCIUM 20 MG/1
20 TABLET, COATED ORAL DAILY
Qty: 30 TABLET | Refills: 1 | Status: SHIPPED | OUTPATIENT
Start: 2024-07-16

## 2024-07-16 RX ORDER — GABAPENTIN 600 MG/1
600 TABLET ORAL 2 TIMES DAILY PRN
Qty: 60 TABLET | Refills: 1 | Status: SHIPPED | OUTPATIENT
Start: 2024-07-16

## 2024-07-16 RX ORDER — METFORMIN HCL 500 MG
500 TABLET, EXTENDED RELEASE 24 HR ORAL
Status: DISCONTINUED | OUTPATIENT
Start: 2024-07-16 | End: 2024-07-16 | Stop reason: HOSPADM

## 2024-07-16 RX ORDER — METFORMIN HCL 500 MG
500 TABLET, EXTENDED RELEASE 24 HR ORAL 2 TIMES DAILY WITH MEALS
Status: DISCONTINUED | OUTPATIENT
Start: 2024-07-17 | End: 2024-07-16 | Stop reason: HOSPADM

## 2024-07-16 RX ORDER — METFORMIN HCL 500 MG
500 TABLET, EXTENDED RELEASE 24 HR ORAL 2 TIMES DAILY WITH MEALS
Status: DISCONTINUED | OUTPATIENT
Start: 2024-07-17 | End: 2024-07-16

## 2024-07-16 RX ADMIN — Medication 15 MG: at 08:16

## 2024-07-16 RX ADMIN — ROSUVASTATIN 20 MG: 10 TABLET, FILM COATED ORAL at 08:17

## 2024-07-16 RX ADMIN — NICOTINE 1 PATCH: 21 PATCH, EXTENDED RELEASE TRANSDERMAL at 08:16

## 2024-07-16 ASSESSMENT — ACTIVITIES OF DAILY LIVING (ADL)
ADLS_ACUITY_SCORE: 30
HYGIENE/GROOMING: INDEPENDENT
ADLS_ACUITY_SCORE: 30

## 2024-07-16 NOTE — PLAN OF CARE
Problem: Sleep Disturbance  Goal: Adequate Sleep/Rest  Outcome: Progressing   Goal Outcome Evaluation:       Patient is sleeping soundly at the start of the shift. No complaints made. Nothing unusual noted. He slept the whole night for 8.5 hours.

## 2024-07-16 NOTE — PLAN OF CARE
BEH IP Unit Acuity Rating Score (UARS)  Patient is given one point for every criteria they meet.    CRITERIA SCORING   On a 72 hour hold, court hold, committed, stay of commitment, or revocation. 0    Patient LOS on BEH unit exceeds 20 days. 0  LOS: 7   Patient under guardianship, 55+, otherwise medically complex, or under age 11. 1   Suicide ideation without relief of precipitating factors. 0   Current plan for suicide. 0   Current plan for homicide. 0   Imminent risk or actual attempt to seriously harm another without relief of factors precipitating the attempt. 0   Severe dysfunction in daily living (ex: complete neglect for self care, extreme disruption in vegetative function, extreme deterioration in social interactions). 0   Recent (last 7 days) or current physical aggression in the ED or on unit. 0   Restraints or seclusion episode in past 72 hours. 0   Recent (last 7 days) or current verbal aggression, agitation, yelling, etc., while in the ED or unit. 0   Active psychosis. 1   Need for constant or near constant redirection (from leaving, from others, etc).  0   Intrusive or disruptive behaviors. 0   Patient requires 3 or more hours of individualized nursing care per 8-hour shift (i.e. for ADLs, meds, therapeutic interventions). 0   TOTAL 2
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
## 2024-07-16 NOTE — PROGRESS NOTES
Rehab Group    Start time: 1900  End time: 1950  Patient time total: 45 minutes    attended partial group    #4 attended   Group Type: recreation   Group Topic Covered: healthy leisure time       Group Session Detail:  TR leisure group     Patient Response/Contribution:  cooperative with task, attentive, and actively engaged       Patient Detail:    Pt participated in Therapeutic Recreation group with focus on leisure participation, communication skills, and critical thinking. Engaged and focused in the group recreational activity via a group game.  Pt was a full participant throughout the entire duration of group.  Appropriately shared sense of humor with peers during group and appeared to brighten with some social interaction.              Activity Therapy Per 15 minutes () Other Rehab therapies    Patient Active Problem List   Diagnosis    Suicide attempt by drug ingestion (H)    Hepatitis C antibody test positive    Suicidal ideation    Suicide attempt (H)    Polysubstance abuse (H)    Schizoaffective disorder, bipolar type (H)    Schizoaffective disorder, chronic condition with acute exacerbation (H)

## 2024-07-16 NOTE — PLAN OF CARE
Problem: Depressive Symptoms  Goal: Depressive Symptoms  Description: Signs and symptoms of listed problems will be absent or manageable.  Note: Patient vital signs. /77   Pulse 84   Temp 98.6  F (37  C) (Temporal)   Resp 16   Wt 95.6 kg (210 lb 12.2 oz)   SpO2 97%   BMI 31.12 kg/m  . Patient has been isolative to himself in the milieu. Out for supper and some TV then back to his room. He went to group after supper but did not stay long. His affect is flat. He denied any suicidal ideation or thoughts of self harm. No peer interaction. He ate well and is well hydrated. Medication compliant. No new behavioral or safety concerns. He is looking forward to discharge tomorrow.

## 2024-07-16 NOTE — PLAN OF CARE
Team Note Due:  Wednesday    Assessment/Intervention/Current Symtoms and Care Coordination:  Chart review and met with team, discussed pt progress, symptomology, and response to treatment.  Discussed the discharge plan and any potential impediments to discharge.    - Writer checked in with pt and discussed plan for discharge later this morning. After pt completed his safety plan, writer called for cab for discharge.     Discharge Plan or Goal:  To return to [facility], pt is discharging on 7/16.     Barriers to Discharge:  None, pt discharging today    Referral Status:  Pt was already involved with [facility] for medication management services. Writer set up therapy for pt as he requested through same clinic. See AVS for more information.     Legal Status:  Voluntary    Contacts:  Cora, [facility], [place]: [phone]  No NEERAJ needed as pt was living there prior to admission.    NEERAJ: Gabriela Weston (daughter): [phone]   *NEERAJ signed on 7/9/2024 and there is electronic copy in chart review under media tab.    NEERAJ: [facility] : [phone]     Upcoming Meetings and Dates/Important Information and next steps:

## 2024-07-16 NOTE — PLAN OF CARE
Discharge Summary  Patient pleasant, calm, and cooperative. Writer reviewed discharge instructions with patient and he verbalized understanding. Patient denied thoughts of harming himself, or others. Denied command hallucinations as he reports he can't understand what his audio hallucinations say. Patient reports feeling safe for discharge. Patient was educated on the importance of following up with his PCP to continue care of his type II diabetes and hepatitis C. Patient discharge to assisted living facility with outpatient psychiatry and therapy follow up. A&Ox4. Patient was escorted off the unit by staff, and staff waited with patient until cab arrived.

## 2024-07-17 ENCOUNTER — PATIENT OUTREACH (OUTPATIENT)
Dept: CARE COORDINATION | Facility: CLINIC | Age: 54
End: 2024-07-17
Payer: COMMERCIAL

## 2024-07-17 NOTE — PROGRESS NOTES
[facility]    Background: Transitional Care Management program identified per system criteria and reviewed by [facility] team for possible outreach.    Assessment: Upon chart review, [facility] Team member will not proceed with patient outreach related to this episode of Transitional Care Management program due to reason below:    Patient discharged back to Assisted Living where staff are there to help with needs    Plan: Transitional Care Management episode addressed appropriately per reason noted above.      SOCO Chapman  [facility]    *Connected Care Resource Team does NOT follow patient ongoing. Referrals are identified based on internal discharge reports and the outreach is to ensure patient has an understanding of their discharge instructions.

## 2024-07-18 LAB — HCV GENTYP SERPL NAA+PROBE: NORMAL

## 2024-10-08 ENCOUNTER — HOSPITAL ENCOUNTER (OUTPATIENT)
Facility: CLINIC | Age: 54
Setting detail: OBSERVATION
Discharge: HOME | End: 2024-10-10
Attending: EMERGENCY MEDICINE | Admitting: PSYCHIATRY & NEUROLOGY
Payer: COMMERCIAL

## 2024-10-08 DIAGNOSIS — F15.90 STIMULANT USE DISORDER: ICD-10-CM

## 2024-10-08 DIAGNOSIS — F25.0 SCHIZOAFFECTIVE DISORDER, BIPOLAR TYPE (H): ICD-10-CM

## 2024-10-08 DIAGNOSIS — F12.10 CANNABIS ABUSE: ICD-10-CM

## 2024-10-08 LAB
AMPHETAMINES UR QL SCN: ABNORMAL
BARBITURATES UR QL SCN: ABNORMAL
BENZODIAZ UR QL SCN: ABNORMAL
BZE UR QL SCN: ABNORMAL
CANNABINOIDS UR QL SCN: ABNORMAL
FENTANYL UR QL: ABNORMAL
GLUCOSE BLDC GLUCOMTR-MCNC: 219 MG/DL (ref 70–99)
OPIATES UR QL SCN: ABNORMAL
PCP QUAL URINE (ROCHE): ABNORMAL

## 2024-10-08 PROCEDURE — 250N000013 HC RX MED GY IP 250 OP 250 PS 637: Performed by: NURSE PRACTITIONER

## 2024-10-08 PROCEDURE — 82962 GLUCOSE BLOOD TEST: CPT

## 2024-10-08 PROCEDURE — 99285 EMERGENCY DEPT VISIT HI MDM: CPT | Mod: 25

## 2024-10-08 PROCEDURE — 80307 DRUG TEST PRSMV CHEM ANLYZR: CPT | Performed by: NURSE PRACTITIONER

## 2024-10-08 PROCEDURE — G0378 HOSPITAL OBSERVATION PER HR: HCPCS

## 2024-10-08 PROCEDURE — 99223 1ST HOSP IP/OBS HIGH 75: CPT | Performed by: NURSE PRACTITIONER

## 2024-10-08 RX ORDER — ARIPIPRAZOLE 15 MG/1
15 TABLET ORAL DAILY
Status: DISCONTINUED | OUTPATIENT
Start: 2024-10-09 | End: 2024-10-08

## 2024-10-08 RX ORDER — GABAPENTIN 600 MG/1
600 TABLET ORAL 3 TIMES DAILY PRN
Status: DISCONTINUED | OUTPATIENT
Start: 2024-10-08 | End: 2024-10-09

## 2024-10-08 RX ORDER — CHLORPROMAZINE HYDROCHLORIDE 50 MG/1
50 TABLET, FILM COATED ORAL AT BEDTIME
Status: DISCONTINUED | OUTPATIENT
Start: 2024-10-08 | End: 2024-10-10 | Stop reason: HOSPADM

## 2024-10-08 RX ORDER — GABAPENTIN 600 MG/1
600 TABLET ORAL 3 TIMES DAILY PRN
Status: DISCONTINUED | OUTPATIENT
Start: 2024-10-08 | End: 2024-10-08

## 2024-10-08 RX ORDER — METFORMIN HYDROCHLORIDE 500 MG/1
500 TABLET, EXTENDED RELEASE ORAL
Status: DISCONTINUED | OUTPATIENT
Start: 2024-10-08 | End: 2024-10-08

## 2024-10-08 RX ORDER — NICOTINE 21 MG/24HR
1 PATCH, TRANSDERMAL 24 HOURS TRANSDERMAL EVERY EVENING
Status: DISCONTINUED | OUTPATIENT
Start: 2024-10-08 | End: 2024-10-10 | Stop reason: HOSPADM

## 2024-10-08 RX ORDER — TRAZODONE HYDROCHLORIDE 100 MG/1
100 TABLET ORAL
Status: DISCONTINUED | OUTPATIENT
Start: 2024-10-08 | End: 2024-10-09

## 2024-10-08 RX ORDER — NICOTINE 21 MG/24HR
1 PATCH, TRANSDERMAL 24 HOURS TRANSDERMAL DAILY
Status: DISCONTINUED | OUTPATIENT
Start: 2024-10-09 | End: 2024-10-08

## 2024-10-08 RX ORDER — CHLORPROMAZINE HYDROCHLORIDE 25 MG/1
25 TABLET, FILM COATED ORAL 3 TIMES DAILY PRN
Status: DISCONTINUED | OUTPATIENT
Start: 2024-10-08 | End: 2024-10-10 | Stop reason: HOSPADM

## 2024-10-08 RX ORDER — GABAPENTIN 600 MG/1
600 TABLET ORAL AT BEDTIME
Status: DISCONTINUED | OUTPATIENT
Start: 2024-10-08 | End: 2024-10-10 | Stop reason: HOSPADM

## 2024-10-08 RX ORDER — ARIPIPRAZOLE 5 MG/1
10 TABLET ORAL DAILY
Status: DISCONTINUED | OUTPATIENT
Start: 2024-10-09 | End: 2024-10-08

## 2024-10-08 RX ORDER — METFORMIN HYDROCHLORIDE 500 MG/1
1000 TABLET, EXTENDED RELEASE ORAL
Status: DISCONTINUED | OUTPATIENT
Start: 2024-10-09 | End: 2024-10-10 | Stop reason: HOSPADM

## 2024-10-08 RX ORDER — GABAPENTIN 600 MG/1
600 TABLET ORAL 2 TIMES DAILY PRN
Status: DISCONTINUED | OUTPATIENT
Start: 2024-10-08 | End: 2024-10-08

## 2024-10-08 RX ORDER — ARIPIPRAZOLE 15 MG/1
15 TABLET ORAL DAILY
Status: DISCONTINUED | OUTPATIENT
Start: 2024-10-09 | End: 2024-10-10 | Stop reason: HOSPADM

## 2024-10-08 RX ORDER — ROSUVASTATIN CALCIUM 10 MG/1
20 TABLET, COATED ORAL DAILY
Status: DISCONTINUED | OUTPATIENT
Start: 2024-10-09 | End: 2024-10-10 | Stop reason: HOSPADM

## 2024-10-08 RX ADMIN — NICOTINE 1 PATCH: 21 PATCH, EXTENDED RELEASE TRANSDERMAL at 21:39

## 2024-10-08 RX ADMIN — METFORMIN ER 500 MG 500 MG: 500 TABLET ORAL at 21:37

## 2024-10-08 RX ADMIN — GABAPENTIN 600 MG: 600 TABLET, FILM COATED ORAL at 21:33

## 2024-10-08 RX ADMIN — CHLORPROMAZINE HYDROCHLORIDE 50 MG: 50 TABLET, FILM COATED ORAL at 22:28

## 2024-10-08 ASSESSMENT — ACTIVITIES OF DAILY LIVING (ADL)
ADLS_ACUITY_SCORE: 35

## 2024-10-08 NOTE — ED TRIAGE NOTES
Pt states that his psychiatrist discontinued his klonopin two weeks ago and now reports hearing voices, suicidal thoughts and insomnia.           Tremor  A tremor is trembling or shaking that you cannot control. Most tremors affect the hands or arms. Tremors can also affect the head, vocal cords, face, and other parts of the body. There are many types of tremors. Common types include:  Essential tremor. These usually occur in people older than 40. This type of tremor may run in families and can happen in otherwise healthy people.  Resting tremor. These occur when the muscles are at rest, such as when your hands are resting in your lap. People with Parkinson's disease often have resting tremors.  Postural tremor. These occur when you try to hold a pose, such as keeping your hands outstretched.  Kinetic tremor. These occur during purposeful movement, such as trying to touch a finger to your nose.  Task-specific tremor. These may occur when you do certain tasks such as writing, speaking, or standing.  Psychogenic tremor. These are greatly reduced or go away when you are distracted. These tremors happen due to underlying stress or psychiatric disease. They can happen in people of all ages.  Some types of tremors have no known cause. Tremors can also be a symptom of nervous system problems (neurological disorders) that may occur with aging. Some tremors go away with treatment, while others do not.    Follow these instructions at home:  Lifestyle    A bottle of beer, a glass of wine, and a glass of hard liquor.  A sign showing that a person should not smoke.  If you drink alcohol:  Limit how much you have to:  0–1 drink a day for women who are not pregnant.  0–2 drinks a day for men.  Know how much alcohol is in a drink. In the [place] one drink equals one 12 oz bottle of beer (355 mL), one 5 oz glass of wine (148 mL), or one 1½ oz glass of hard liquor (44 mL).  Do not use any products that contain nicotine or tobacco. These products include cigarettes, chewing tobacco, and vaping devices, such as e-cigarettes. If you need help quitting, ask your health care provider.  Avoid extreme heat and extreme cold.  Limit your caffeine intake, as told by your health care provider.  Try to get 8 hours of sleep each night.  Find ways to manage your stress, such as meditation or yoga.  General instructions    Take over-the-counter and prescription medicines only as told by your health care provider.  Keep all follow-up visits. This is important.  Contact a health care provider if:  You develop a tremor after starting a new medicine.  You have a tremor along with other symptoms such as:  Numbness.  Tingling.  Pain.  Weakness.  Your tremor gets worse.  Your tremor interferes with your day-to-day life.  Summary  A tremor is trembling or shaking that you cannot control.  Most tremors affect the hands or arms.  Some types of tremors have no known cause. Others may be a symptom of nervous system problems (neurological disorders).  Make sure you discuss any tremors you have with your health care provider.  This information is not intended to replace advice given to you by your health care provider. Make sure you discuss any questions you have with your health care provider.

## 2024-10-09 LAB — GLUCOSE BLDC GLUCOMTR-MCNC: 287 MG/DL (ref 70–99)

## 2024-10-09 PROCEDURE — G0378 HOSPITAL OBSERVATION PER HR: HCPCS

## 2024-10-09 PROCEDURE — 250N000013 HC RX MED GY IP 250 OP 250 PS 637: Performed by: PSYCHIATRY & NEUROLOGY

## 2024-10-09 PROCEDURE — 82962 GLUCOSE BLOOD TEST: CPT

## 2024-10-09 PROCEDURE — 99232 SBSQ HOSP IP/OBS MODERATE 35: CPT | Performed by: PSYCHIATRY & NEUROLOGY

## 2024-10-09 PROCEDURE — 250N000013 HC RX MED GY IP 250 OP 250 PS 637: Performed by: NURSE PRACTITIONER

## 2024-10-09 RX ORDER — ROPINIROLE 2 MG/1
2 TABLET, FILM COATED ORAL AT BEDTIME
Status: DISCONTINUED | OUTPATIENT
Start: 2024-10-09 | End: 2024-10-10 | Stop reason: HOSPADM

## 2024-10-09 RX ORDER — IBUPROFEN 600 MG/1
600 TABLET, FILM COATED ORAL EVERY 4 HOURS PRN
Status: DISCONTINUED | OUTPATIENT
Start: 2024-10-09 | End: 2024-10-10 | Stop reason: HOSPADM

## 2024-10-09 RX ORDER — TRAZODONE HYDROCHLORIDE 50 MG/1
50 TABLET, FILM COATED ORAL AT BEDTIME
Status: DISCONTINUED | OUTPATIENT
Start: 2024-10-09 | End: 2024-10-10 | Stop reason: HOSPADM

## 2024-10-09 RX ADMIN — METFORMIN ER 500 MG 1000 MG: 500 TABLET ORAL at 17:30

## 2024-10-09 RX ADMIN — ROSUVASTATIN CALCIUM 20 MG: 10 TABLET, FILM COATED ORAL at 07:49

## 2024-10-09 RX ADMIN — ROPINIROLE HYDROCHLORIDE 2 MG: 2 TABLET, FILM COATED ORAL at 21:51

## 2024-10-09 RX ADMIN — GABAPENTIN 600 MG: 600 TABLET, FILM COATED ORAL at 07:52

## 2024-10-09 RX ADMIN — GABAPENTIN 600 MG: 600 TABLET, FILM COATED ORAL at 21:50

## 2024-10-09 RX ADMIN — NICOTINE 1 PATCH: 21 PATCH, EXTENDED RELEASE TRANSDERMAL at 20:07

## 2024-10-09 RX ADMIN — TRAZODONE HYDROCHLORIDE 50 MG: 50 TABLET ORAL at 21:50

## 2024-10-09 RX ADMIN — ARIPIPRAZOLE 15 MG: 15 TABLET ORAL at 07:49

## 2024-10-09 RX ADMIN — CHLORPROMAZINE HYDROCHLORIDE 50 MG: 50 TABLET, FILM COATED ORAL at 21:51

## 2024-10-09 NOTE — PLAN OF CARE
"Benitez Castillo  October 8, 2024  Plan of Care Hand-off Note     Patient Care Path: observation    Plan for Care:   Pt presents to the emergency department with  command auditory hallucinations telling him to kill himself, insomnia, and paranoia. Pt says the voices are telling him to \"do myself in\". Pt reports he has been off of his Klonopin for several weeks after the provider discontinued the prescription, he is requesting his prescription be filled. Pt was met with a medication provider at [facility] and made a plan to address pt's current symptoms.  Pt lives in an assisted living facility in [place], his daughter indicates they provide him with his medications daily. Pt is requesting a new psychiatry provider.      A lower level of care has been unsuccessful in treating and stabilizing patient s mental health symptoms. [facility] [place] [facility] for continued monitoring, treatment and therapeutic intervention of mental health symptoms. Observation at [facility] could help mitigate the need for a more restrictive level of care in an inpatient setting.    Identified Goals and Safety Issues: Medication Management, Safety Planning, Referral for Psychiatry.    Overview:  Gabriela Weston. Pt's adult daughter.          Legal Status: Legal Status at Admission: Voluntary/Patient has signed consent for treatment    Psychiatry Consult: Yes       Updated Shayan Balderas  regarding plan of care.           BRANDI Amaro        "

## 2024-10-09 NOTE — ED PROVIDER NOTES
"  Emergency Department Note      History of Present Illness     Chief Complaint  Psychiatric Evaluation      HPI  Benitez Castillo is a 54 year old male with a history of schizoaffective disorder, alcohol abuse, previous suicide attempt and hep C who presents for psychiatric evaluation.  Patient states that his primary care physician discontinued his Klonopin about 2 months ago.  Over the past week, he has begun experiencing auditory hallucinations, suicidal thoughts, and increased insomnia.  He has put in a few refill requests over the past week, however, his PCP would like him to go through psychiatry for this.  Regarding his symptoms, he endorses passive SI, but expresses concern due to previous suicide attempts that he has had.  He also endorses manic episodes over the past week, however here in the ED he notes that he is coming down from these manic highs.  He also notes using meth 3 days ago which he usually uses every few weeks.  He is reportedly taking his other medications as prescribed.  No vomiting, fevers, or rashes.  He has been to our empath unit in the past and is amenable to return tonight.    Independent Historian  None    Review of External Notes  I reviewed the discharge summary from 7/9 as well as the family medicine office visit from 8/1.    Past Medical History   Medical History and Problem List  Alcohol abuse  Asthma  Hallucination  Hep C  Suicide attempt  Paranoia  Polysubstance abuse  Tobacco abuse    Medications  Metformin  Rosuvastatin  Thorazine  Abilify  Gabapentin    Surgical History   Cystectomy  Dental surgery  Hernia repair    Physical Exam   Patient Vitals for the past 24 hrs:   BP Temp Temp src Pulse Resp SpO2 Height Weight   10/08/24 2037 134/88 98.2  F (36.8  C) Oral 97 16 95 % 1.772 m (5' 9.75\") 98.5 kg (217 lb 3.2 oz)   10/08/24 1823 136/83 97.2  F (36.2  C) Temporal 107 16 97 % 1.753 m (5' 9\") 98 kg (216 lb)     Physical Exam  Eye:  Pupils are equal, round, and reactive.  " Extraocular movements intact.    ENT:  No rhinorrhea.  Moist mucus membranes.  Normal tongue and tonsil.    Cardiac:  Regular rate and rhythm.  No murmurs, gallops, or rubs.    Pulmonary:  Clear to auscultation bilaterally.  No wheezes, rales, or rhonchi.    Abdomen:  Positive bowel sounds.  Abdomen is soft and non-distended, without focal tenderness.    Musculoskeletal:  Normal movement of all extremities without evidence for deficit.    Skin:  Warm and dry without rashes.    Neurologic:  Non-focal exam without asymmetric weakness or numbness.     Psychiatric:  Normal affect with appropriate interaction with examiner. Patient describes passive suicidal thoughts    Diagnostics   Lab Results   Labs Ordered and Resulted from Time of ED Arrival to Time of ED Departure   URINE DRUG SCREEN PANEL - Abnormal       Result Value    Amphetamines Urine Screen Positive (*)     Barbituates Urine Screen Negative      Benzodiazepine Urine Screen Negative      Cannabinoids Urine Screen Positive (*)     Cocaine Urine Screen Negative      Fentanyl Qual Urine Screen Negative      Opiates Urine Screen Negative      PCP Urine Screen Negative     GLUCOSE BY METER - Abnormal    GLUCOSE BY METER POCT 219 (*)    GLUCOSE MONITOR NURSING POCT       Independent Interpretation  None    ED Course    Medications Administered  Medications   chlorproMAZINE (THORAZINE) tablet 50 mg (50 mg Oral $Given 10/8/24 2228)   gabapentin (NEURONTIN) tablet 600 mg (600 mg Oral $Given 10/8/24 2133)   rosuvastatin (CRESTOR) tablet 20 mg (has no administration in time range)   nicotine (NICODERM CQ) 21 MG/24HR 24 hr patch 1 patch (1 patch Transdermal $Patch/Med Applied 10/8/24 2139)   chlorproMAZINE (THORAZINE) tablet 25 mg (has no administration in time range)   gabapentin (NEURONTIN) tablet 600 mg (has no administration in time range)   traZODone (DESYREL) tablet 100 mg (has no administration in time range)   ARIPiprazole (ABILIFY) tablet 15 mg (has no  administration in time range)   metFORMIN (GLUCOPHAGE XR) 24 hr tablet 1,000 mg (has no administration in time range)       Discussion of Management  None    Social Determinants of Health adding to complexity of care  Schizoaffective disorder, affecting complexity of care.    ED Course  ED Course as of 10/08/24 9995   Tue Oct 08, 2024   2018 I evaluated the patient     Medical Decision Making / Diagnosis   CMS Diagnoses: None    MIPS  None    MDM  Benitez Castillo is a 54 year old male with a history of schizoaffective disorder, presenting to us requesting assistance in the EMPATH unit.  He states that his Klonopin was discontinued approximately 2 months ago, and he feels he has gone through a spell of sajan and is now cycling down into depression.  He is having increasing thoughts of self-harm, though contracts for safety at this time.  He does admit to some methamphetamine use 3 days ago, but otherwise denies any other illicit drugs.  He has no physical concerns for me and his vital signs and physical exam are reassuring.  I feel he is an ideal candidate for the EMPATH unit for further psychiatric assessment and medication adjustment.  This is his desire as well.    Disposition  The patient was transferred to [facility].     ICD-10 Codes:    ICD-10-CM    1. Schizoaffective disorder, bipolar type (H)  F25.0       2. Stimulant use disorder  F15.90     methamphetamine      3. Cannabis abuse  F12.10          Scribe Disclosure:  I, CLINTON KILPATRICK, am serving as a scribe at 7:58 PM on 10/8/2024 to document services personally performed by Trierweiler, Chad A, MD based on my observations and the provider's statements to me.     [employer]      Trierweiler, Chad A, MD  10/08/24 0717

## 2024-10-09 NOTE — ED NOTES
Writer brought patient back to [facility] from triage. Pt willingly gave writer belongings including a pocket knife, cigarettes, keys and wallet. Writer placed belongings in [facility] locker and notified nurse for the patient.

## 2024-10-09 NOTE — ED PROVIDER NOTES
"[facility] Unit - Initial Psychiatric Observation Note  [facility] Emergency Department  Observation Initiation Date: Oct 8, 2024    Benitez Castillo MRN: [id number]   Age: 54 year old YOB: 1970     History     Chief Complaint   Patient presents with    Psychiatric Evaluation     HPI  Benitez Castillo is a 54 year old male with a past history notable for schizoaffective disorder - bipolar type, stimulant use disorder (methamphetamine), cannabis abuse, and Type II diabetes mellitus. Patient presented to the emergency department for evaluation of suicidal ideation and auditory hallucinations commanding him to \"end it all.\" Patient was medically evaluated in the emergency department and determined to be medically stable for transfer to [facility] for further psychiatric assessment. Patient is nearing 3.5 hours in emergency care.     Here at [facility], patient describes that since discharge from inpatient psychiatry in July, \"things have not been good.\" Patient reports that he began to experience auditory hallucinations about two weeks ago, which have been telling him to end his life. Patient reports he ran out of clonazepam about 1 month ago, but his PCP and outpatient psychiatrist have been unwilling to refill it for him. Patient reports his psychiatrist \"cut me off\" due to finding out about his methamphetamine use. His PCP has been declining his refills. Patient states he has been taking his other medications, but cannot recall the name of his psychiatrist or the clinic at which she practices. Patient reports he has been unable to sleep for 1 month. He has been losing weight over the last couple of weeks due to decreased appetite. He endorses increased paranoia recently, feeling as though there are people out to get him or wanting to harm him. Pt reports he last used meth about 3 days ago. Has been using cannabis intermittently as well. Reports he lives at an assisted living facility in [place], moved there in " February. Endorses support from his daughter. Patient is requesting to spend a couple days at [facility] for safety and stabilization. Willing to resume his most recently prescribed medications. Discussed that this writer will not resume clonazepam tonight but will trial alternatives to help with his anxiety symptoms.       Past Medical History  Past Medical History:   Diagnosis Date    Alcoholism /alcohol abuse     in remission since 12/12    Asthma     as a child. Last epidsode was at age 16.    Hallucinations     drug induced    Hepatitis C antibody test positive 8-    History of psychiatric care     [facility] seen when in [facility]    History of suicide attempt     2004 while in [facility]    Paranoia (H)     Polysubstance abuse (H)     methamphetamine, cannabus, synthetic cannabis and dilaudid    Suicidal ideations     Tobacco abuse      Past Surgical History:   Procedure Laterality Date    CYSTECTOMY PILONIDAL      DENTAL SURGERY      HERNIA REPAIR       ARIPiprazole (ABILIFY) 15 MG tablet  chlorproMAZINE (THORAZINE) 25 MG tablet  clonazePAM (KLONOPIN) 0.5 MG tablet  gabapentin (NEURONTIN) 600 MG tablet  gabapentin (NEURONTIN) 600 MG tablet  metFORMIN (GLUCOPHAGE XR) 500 MG 24 hr tablet  rosuvastatin (CRESTOR) 20 MG tablet      No Known Allergies  Family History  Family History   Problem Relation Age of Onset    Respiratory Mother         emphysema    Family History Negative Father         has only met his father once.    C.A.D. Sister 40        CABG x 3     Social History   Social History     Tobacco Use    Smoking status: Every Day     Current packs/day: 1.00     Average packs/day: 1 pack/day for 30.0 years (30.0 ttl pk-yrs)     Types: Cigarettes   Substance Use Topics    Alcohol use: No     Comment: Abused alcohol from age 11 til December, 2012    Drug use: Yes     Comment: methamphetamines, THC, syn THC, narcotics          Review of Systems  A medically appropriate review of systems was  "performed with pertinent positives and negatives noted in the HPI, and all other systems negative.    Physical Examination   BP: 136/83  Pulse: 107  Temp: 97.2  F (36.2  C)  Resp: 16  Height: 175.3 cm (5' 9\")  Weight: 98 kg (216 lb)  SpO2: 97 %    Physical Exam  General: Appears stated age.   Neuro: Alert and fully oriented. Extremities appear to demonstrate normal strength on visual inspection.   Integumentary/Skin: no rash visualized, normal color    Psychiatric Examination   Appearance: awake, alert, adequately groomed, dressed in hospital scrubs, and appeared as age stated  Attitude:  cooperative  Eye Contact:  fair  Mood:  anxious  Affect:  mood congruent and intensity is normal  Speech:  clear, coherent and normal prosody  Psychomotor Behavior:  no evidence of tardive dyskinesia, dystonia, or tics  Thought Process:  linear and goal oriented  Associations:  no loose associations  Thought Content:  passive suicidal ideation present, auditory hallucinations present, no visual hallucinations present, and endorses paranoid delusions  Insight:  fair  Judgement:  fair  Oriented to:  time, person, and place  Attention Span and Concentration:  fair  Recent and Remote Memory:  fair  Language: able to name/identify objects without impairment  Fund of Knowledge: intact with awareness of current and past events    ED Course     ED Course as of 10/08/24 2127   Tue Oct 08, 2024   2018 I evaluated the patient       Labs Ordered and Resulted from Time of ED Arrival to Time of ED Departure   URINE DRUG SCREEN PANEL - Abnormal       Result Value    Amphetamines Urine Screen Positive (*)     Barbituates Urine Screen Negative      Benzodiazepine Urine Screen Negative      Cannabinoids Urine Screen Positive (*)     Cocaine Urine Screen Negative      Fentanyl Qual Urine Screen Negative      Opiates Urine Screen Negative      PCP Urine Screen Negative     GLUCOSE MONITOR NURSING POCT       Assessments & Plan (with Medical Decision " Making)   Patient presenting with auditory hallucinations telling him to harm himself, occurring in the setting of recent methamphetamine use and possible medication non-adherence. Pt reports he has not been without clonazepam for about 1 month, refills have been declined by his PCP and psychiatrist apparently due to his amphetamine use. Nursing notes reviewed noting no acute issues.     I have reviewed the assessment completed by the [facility].     During the observation period, the patient did not require medications for agitation, and did not require restraints/seclusion for patient and/or provider safety.     The patient was found to have a psychiatric condition that would benefit from an observation stay in the emergency department for further psychiatric stabilization and/or coordination of a safe disposition. The observation plan includes serial assessments of psychiatric condition, potential administration of medications if indicated, further disposition pending the patient's psychiatric course during the monitoring period.     Preliminary diagnosis:    ICD-10-CM    1. Schizoaffective disorder, bipolar type (H)  F25.0       2. Stimulant use disorder  F15.90     methamphetamine      3. Cannabis abuse  F12.10            Treatment Plan:  - Restart Abilify at 15 mg daily for mood stabilization and treatment of psychosis.  - Restart thorazine 50 mg at bedtime for insomnia and additional antipsychotic treatment  - Restart gabapentin 600 mg nightly for anxiety and insomnia.   - Gabapentin will be available PRN at 600 mg TID for acute anxiety  - Will not reorder clonazepam, discussed with patient  - Will order thorazine 25 mg TID PRN for severe anxiety or agitation  - Trazodone 100 mg at bedtime PRN available for insomnia if other measures are unsuccessful  - Nicotine replacement ordered  - Resume metformin XR 1000 mg daily with dinner. Will order BID blood glucose checks. A1c from 8/1/24 was elevated at 9.3. Recommend  close followup with PCP for ongoing management.  - Pt would benefit from increased support at his senior living, including medication administration to promote adherence (if not already in place). May also benefit from case management services for assistance with coordination of care for psychiatric and medical issues.  - Urine drug screen reviewed, noted to be positive for cannabis and amphetamines. Patient declines chemical health resources. Encouraged to abstain from non-prescribed mind or mood-altering substances.   - [place] to observation status. Plan for reassessment tomorrow.     --  Shayan Balderas CNP   [facility] EMERGENCY DEPT  EmPATH Unit        Shayan Balderas CNP  10/08/24 3164

## 2024-10-09 NOTE — CONSULTS
"Diagnostic Evaluation Consultation  Crisis Assessment    Patient Name: Benitez Castillo  Age:  54 year old  Legal Sex: male  Gender Identity: male  Pronouns: He/Him/His  Race: White  Ethnicity: Not  or   Language: English      Patient was assessed: In person   Crisis Assessment Start Date: 10/08/24  Crisis Assessment Start Time: 2047  Crisis Assessment Stop Time: 2100  Patient location: [facility] EMERGENCY DEPT                             EMP01    Referral Data and Chief Complaint  Benitez Castillo presents to the ED by  self. Patient is presenting to the ED for the following concerns: Suicidal ideation, Other (see comment) (insomnia and hearing voices.).   Factors that make the mental health crisis life threatening or complex are:   Patient presents to the emergency department with  command auditory hallucinations telling him to kill himself, insomnia, and paranoia. Pt says the voices are telling him to \"do myself in\". Patient reports his psychiatrist discontinued his klonopin two weeks ago and now he's hearing voices telling him to \"off myself\" and not sleeping well. Patient also endorses paranoia.  Patient reports using methamphetamines a few days ago and marijuana daily. Patient tells LMHP he is not interested in CD tx, he says he has started tx 21 times in his life. Patient is requesting medication management and referral for a new psychiatry provider..      Informed Consent and Assessment Methods  Explained the crisis assessment process, including applicable information disclosures and limits to confidentiality, assessed understanding of the process, and obtained consent to proceed with the assessment.  Assessment methods included conducting a formal interview with patient, review of medical records, collaboration with medical staff, and obtaining relevant collateral information from family and community providers when available.  : done     Patient response to interventions: " acceptance expressed  Coping skills were attempted to reduce the crisis:  Pt came to the hospital when his daughter suggested he do so.     History of the Crisis   Pt is a 54 year old man with a history of schizoaffective/bipolar disorder and substance use disorder. He reports that he has a history of manic episodes that include sleeplessness, hyperactivity, impulsivity, increased spending, and grandiosity. These manic episodes can last a few weeks and are typically followed by a period of depression. He also has a history of having delusions and hallucinations that are not always corresponding to manic periods. He has had numerous hospitalizations in the past and says he has been to  tx 21 times.    Brief Psychosocial History  Family:  , Children yes  Support System:  Children, Facility resident(s)/Staff, Friend  Employment Status:  unemployed  Source of Income:  other community resources  Financial Environmental Concerns:  unable to afford rent/mortgage, unemployed  Current Hobbies:     Barriers in Personal Life:  financial concerns, mental health concerns, medical conditions/precautions    Significant Clinical History  Current Anxiety Symptoms:     Current Depression/Trauma:  thoughts of death/suicide  Current Somatic Symptoms:     Current Psychosis/Thought Disturbance:  auditory hallucinations, high risk behavior (Pt says he is hearing voices telling him to kill himself, pt continues to use methamphetamines.)  Current Eating Symptoms:   (Pt denies eating and appetite issues. He told [facility] he was hungry during the assessment.)  Chemical Use History:  Alcohol: None, Other (comments) (Pt says he hasn't drank alcohol in many years.)  Cocaine: None  Marijuana: Daily  Last Use:: 10/07/24  Other Use: Methamphetamines (Pt used methamphetamines about 3 days ago.)  Last Use:: 10/04/24  Withdrawal Symptoms: Hallucinations   Past diagnosis:  Bipolar Disorder, Depression, Suicide attempt(s), Substance Use  "Disorder  Family history:  No known history of mental health or chemical health concerns  Past treatment:  Individual therapy, Primary Care, Psychiatric Medication Management, Inpatient Hospitalization, AA/NA, Supportive Living Environment (group home, detention house, etc)  Details of most recent treatment:  Pt had IP hospitalization in July 2024, he reports he was working with an outpatient psychiatrist out of [place].  Other relevant history:          Collateral Information  Is there collateral information: Yes     Collateral information name, relationship, phone number:  Gabriela Hughes [phone]    What happened today: Pt's daughter says she convinced her dad to come in today, he stopped taking meds not too long ago. Gabriela says pt does much better when he is on his Klonopin. She says once he stabilizes, providers stop giving it to him and he gets worse again. Gabriela said,  dad didn't know what day it was when she visited him.  Pt says dad was complaining of arm pain that radiated into his chest, he thought he was having a heart attack. Gabriela thinks it was a panic or anxiety attack. Pt's daughter, Gabriela was able to convince him to come into the hospital.     What is different about patient's functioning: Pt says her dad was \"really quiet\" which is not like him, he didn't remember what day it was. Gabriela said her dad admitted that he used meth a few days ago but did not recall what day it was.     Concern about alcohol/drug use:  Yes, she says she know her dad used meth a few days ago.    What do you think the patient needs:  Pt's daughter would like to see him with an ongoing prescription for klonopin, she thinks he is most stable and healthy when he able to take it.     Has patient made comments about wanting to kill themselves/others: no    If d/c is recommended, can they take part in safety/aftercare planning:  yes    Additional collateral information:   Gabriela says pt usually tells her if he's suicidal " but did not this time.     Risk Assessment  Stephens Suicide Severity Rating Scale Full Clinical Version:  Suicidal Ideation  Q6 Suicide Behavior (Lifetime): yes          Stephens Suicide Severity Rating Scale Recent:   Suicidal Ideation (Recent)  Q1 Wished to be Dead (Past Month): yes  Q2 Suicidal Thoughts (Past Month): yes  Q3 Suicidal Thought Method: no  Q4 Suicidal Intent without Specific Plan: yes  Q5 Suicide Intent with Specific Plan: no  If yes to Q6, within past 3 months?: no  Level of Risk per Screen: high risk          Environmental or Psychosocial Events: loss of a loved one, unemployment/underemployment, unstable housing, homelessness, ongoing abuse of substances, neither working nor attending school  Protective Factors: Protective Factors: strong bond to family unit, community support, or employment, help seeking (Pt says he's very close with his adult daughter, Gabriela.)    Does the patient have thoughts of harming others? Feels Like Hurting Others: no  Previous Attempt to Hurt Others: no  Current presentation:  (Pt presents as calm and alert.)  Is the patient engaging in sexually inappropriate behavior?: no    Is the patient engaging in sexually inappropriate behavior?  no        Mental Status Exam   Affect: Appropriate  Appearance: Appropriate  Attention Span/Concentration: Attentive  Eye Contact: Variable, Engaged    Fund of Knowledge: Delayed   Language /Speech Content: Fluent  Language /Speech Volume: Normal  Language /Speech Rate/Productions: Normal  Recent Memory: Poor  Remote Memory: Variable  Mood: Normal  Orientation to Person: Yes   Orientation to Place: Yes  Orientation to Time of Day: Yes  Orientation to Date: Yes     Situation (Do they understand why they are here?): Yes  Psychomotor Behavior: Normal  Thought Content: Hallucinations, Paranoia, Suicidal  Thought Form: Paranoia, Intact (Pt reports paranoia.)        Medication  Psychotropic medications:   Medication Orders - Psychiatric  "(From admission, onward)      Start     Dose/Rate Route Frequency Ordered Stop    10/09/24 0800  ARIPiprazole (ABILIFY) tablet 15 mg         15 mg Oral DAILY 10/08/24 2148      10/08/24 2200  chlorproMAZINE (THORAZINE) tablet 50 mg         50 mg Oral AT BEDTIME 10/08/24 2059      10/08/24 2121  traZODone (DESYREL) tablet 100 mg         100 mg Oral AT BEDTIME PRN 10/08/24 2121      10/08/24 2121  chlorproMAZINE (THORAZINE) tablet 25 mg         25 mg Oral 3 TIMES DAILY PRN 10/08/24 2121      10/08/24 2110  nicotine (NICODERM CQ) 21 MG/24HR 24 hr patch 1 patch         1 patch  over 24 Hours Transdermal EVERY EVENING 10/08/24 2103               Current Care Team  Patient Care Team:  Yanna Bhatia CNP as PCP - General    Diagnosis  Patient Active Problem List   Diagnosis Code    Suicide attempt by drug ingestion (H) T50.902A    Hepatitis C antibody test positive R76.8    Suicidal ideation R45.851    Suicide attempt (H) T14.91XA    Polysubstance abuse (H) F19.10    Schizoaffective disorder, bipolar type (H) F25.0    Schizoaffective disorder, chronic condition with acute exacerbation (H) F25.9    Cannabis abuse F12.10    Stimulant use disorder F15.90       Primary Problem This Admission  Cannabis abuse F12.10      Stimulant use disorder F15.90      Schizoaffective disorder, bipolar type (H) F25.0    Clinical Summary and Substantiation of Recommendations   Pt presents to the emergency department for psychiatric evaluation of hearing voices, insomnia, and medication management. Pt says the voices are telling him to \"do myself in\". Pt reports he has been off of his Klonopin for several weeks after the provider discontinued the prescription and he is requesting his prescription be filled. Pt was met with a medication provider at [facility] and made a plan to address pt's current symptoms.  [facility] [place] his daughter indicates they provide him with his medications daily. A lower level of " care has been unsuccessful in treating and stabilizing patient s mental health symptoms. Patient will remain on Em[facility] unit under observation for continued monitoring, treatment and therapeutic intervention of mental health symptoms. Observation at [facility] could help mitigate the need for a more restrictive level of care in an inpatient setting.                          Patient coping skills attempted to reduce the crisis:  Pt came to the hospital when his daughter suggested he do so.    Disposition  Recommended disposition: Observation, Medication Management, Other. please comment (Coordination w/ [facility] waiver team in [facility], per daughter Gabriela.)        Reviewed case and recommendations with attending provider. Attending Name: Yes, Shayan Balderas       Attending concurs with disposition: yes       Patient and/or validated legal guardian concurs with disposition:   yes       Final disposition:  observation    Legal status on admission: Voluntary/Patient has signed consent for treatment    Assessment Details   Total duration spent with the patient: 13 min     CPT code(s) utilized: Non-Billable    BRANDI Amaro, Psychotherapist  [facility] - Triage & Transition Services  Callback: [phone]

## 2024-10-09 NOTE — PROGRESS NOTES
"Triage & Transition Services, Extended Care     Therapy Progress Note    Patient: Benitez goes by \"Benitez,\" uses he/him pronouns  Date of Service: October 9, 2024  Site of Service: [facility] EMERGENCY DEPT                             EMP01    Patient was seen: Yes  Mode of Assessment: In person    Presentation Summary: Pt was asleep in his recliner when this writer approached and invited him to meet in consult room for reassessment. Pt readily met with this writer. Pt reported feeling \"a little tired. I got a few hours of sleep.\" Pt endorsed hearing voices today stating, \"they are in the background--just mumbling. They are not telling me to kill myself.\" Pt reported, \"I am still feeling paranoid. I feel like people are out to get me.\" Pt did not specify any people on the unit or in general. Pt requested if he could be \"put on Trazodone 50mg to help with sleep. It has helped in the past.\" This writer informed pt she would relay this request to the psychiatrist and encouraged pt to have this conversation with the psychiatrist as well. Pt reported a favorable appetite this morning stating he \"had breakfast. I ate most of it.\" Pt did not endorse SI, HI, NSSIB or VH. Pt expressed a desire to stay on observation status today for continued symptom stabilization and medication management. Pt would like to be scheduled with an outpatient psychiatric provider when ready to discharge.    Therapeutic Intervention(s) Provided: Engaged in guided discovery, explored patient's perspectives and helped expand them through socratic dialogue., Reviewed healthy living that supports positive mental health, including looking at sleep hygiene, regular movement, nutrition, and regular socialization., Provided positive reinforcement for progress towards goals, gains in knowledge, and application of skills previously taught.    Current Symptoms: Auditory hallucinations      Mental Status Exam   Affect: Appropriate  Appearance: " "Appropriate  Attention Span/Concentration: Attentive  Eye Contact: Engaged    Fund of Knowledge: Appropriate   Language /Speech Content: Fluent  Language /Speech Volume: Normal  Language /Speech Rate/Productions: Normal  Recent Memory: Intact  Remote Memory: Intact  Mood: Normal  Orientation to Person: Yes   Orientation to Place: Yes  Orientation to Time of Day: Yes  Orientation to Date: Yes     Situation (Do they understand why they are here?): Yes  Psychomotor Behavior: Normal  Thought Content: Hallucinations  Thought Form: Paranoia    Treatment Objective(s) Addressed: Rapport building, processing feelings, identifying an appropriate aftercare plan, assessing safety, identifying treatment goals    Patient Response to Interventions: Acceptance expressed, verbalizes understanding    Progress Towards Goals:   Patient Reports Symptoms Are: Ongoing  Patient Progress Toward Goals: Is making progress  Next Step to Work Toward Discharge: Symptom stabilization, patient ability to engage in safety planning    Plan:   Final Disposition / Recommended Care Path: Observation  Plan for Care reviewed with assigned Medical Provider: Yes   Plan for Care Team Review: Provider, RN   Comments: Dr. Nigel Rodriguez  Patient and/or validated legal guardian concurs: Yes    Clinical Substantiation: The pt was found to have a psychiatric condition that would benefit from an observation stay in the emergency department for further psychiatric stabilization and/or coordination of a safe disposition. Pt endorsed hearing voices today stating, \"they are in the background--just mumbling. They are not telling me to kill myself\" and \"I am still feeling paranoid. I feel like people are out to get me\" in addition to struggling to get restful sleep.  A lower level of care has been unsuccessful in treating and stabilizing pt's mental health symptoms, and observation at [facility] could help mitigate the need for a more restrictive level of care in an " inpatient setting. The observation plan includes brief, crisis focused therapy and psychiatric services. Pt will remain on Em[facility] unit under observation for continued monitoring, treatment and therapeutic intervention of mental health symptoms. Pt is agreeable to remain on observation status.     Legal Status:  Legal Status at Admission: Voluntary/Patient has signed consent for treatment    Session Status:   Time session started: 0850  Time session ended: 0855  Session Duration (minutes): 5 minutes  Session Number: 1  Anticipated number of sessions or this episode of care: 1    Time Spent: 5 minutes    CPT Code: CPT Codes: Non-Billable    Diagnosis:   Patient Active Problem List   Diagnosis Code    Suicide attempt by drug ingestion (H) T50.902A    Hepatitis C antibody test positive R76.8    Suicidal ideation R45.851    Suicide attempt (H) T14.91XA    Polysubstance abuse (H) F19.10    Schizoaffective disorder, bipolar type (H) F25.0    Schizoaffective disorder, chronic condition with acute exacerbation (H) F25.9    Cannabis abuse F12.10    Stimulant use disorder F15.90     Primary Problem This Admission:   Active Hospital Problems    Cannabis abuse      Stimulant use disorder      Schizoaffective disorder, bipolar type (H)                      BRANDI Rowe   Licensed Mental Health Professional (LMHP), [facility]  [phone]

## 2024-10-09 NOTE — ED NOTES
Pt quiet cooperative,states he hears voices that are not clear but mumbling. Still feels paranoid,depressed and anxious. Took Gabapentin this AM. Pt states he lives in assisted living where he gets meals and they deliver his medications.He states he is tired and is resting in chair 1
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
## 2024-10-09 NOTE — PROGRESS NOTES
"54 year old male received from ED for psychiatric evaluation. Reports he came in for a medication adjustment, and ran out of his Klonopin and is now experiencing suicidal thoughts and auditory hallucinations telling him to \"end it\". Patient reports that he used meth 3 days ago as well, and used marijuana today.  Nursing and risk assessments completed. Assessments reviewed with LMHP and physician. Admission information reviewed with patient. Patient given a tour of [facility] and instructions on using the facility. Questions regarding [facility] addressed. Pt safety search completed.     "

## 2024-10-09 NOTE — ED NOTES
[facility]  ED to [facility] Checklist:      Goal for E[facility]: Suicidality    Current Behavior: Calm and Cooperative    Safety Concerns: Suicidal, no plan    Legal Hold Status: Voluntary    Medically Cleared by ED provider: Yes    Patient Therapeutically Searched: Therapeutic search by ED staff (strings, belts, shoes, pockets, electronics, etc.)    Belongings: In room locker    Independent Ambulation at Baseline: Yes/No: Yes    Participates in Care/Conversation: Yes/No: Yes    Patient Informed about [facility]: Yes/No: Yes    DEC: not ordered    Patient Ready to be Transferred to [facility]? Yes/No: Yes

## 2024-10-10 VITALS
DIASTOLIC BLOOD PRESSURE: 78 MMHG | RESPIRATION RATE: 16 BRPM | OXYGEN SATURATION: 97 % | WEIGHT: 217.2 LBS | SYSTOLIC BLOOD PRESSURE: 135 MMHG | HEIGHT: 70 IN | BODY MASS INDEX: 31.09 KG/M2 | TEMPERATURE: 97.6 F | HEART RATE: 102 BPM

## 2024-10-10 LAB
FERRITIN SERPL-MCNC: 334 NG/ML (ref 31–409)
GLUCOSE BLDC GLUCOMTR-MCNC: 194 MG/DL (ref 70–99)

## 2024-10-10 PROCEDURE — 250N000013 HC RX MED GY IP 250 OP 250 PS 637: Performed by: NURSE PRACTITIONER

## 2024-10-10 PROCEDURE — 99239 HOSP IP/OBS DSCHRG MGMT >30: CPT | Performed by: PSYCHIATRY & NEUROLOGY

## 2024-10-10 PROCEDURE — 82962 GLUCOSE BLOOD TEST: CPT

## 2024-10-10 PROCEDURE — 36415 COLL VENOUS BLD VENIPUNCTURE: CPT | Performed by: PSYCHIATRY & NEUROLOGY

## 2024-10-10 PROCEDURE — G0378 HOSPITAL OBSERVATION PER HR: HCPCS

## 2024-10-10 PROCEDURE — 82728 ASSAY OF FERRITIN: CPT | Performed by: PSYCHIATRY & NEUROLOGY

## 2024-10-10 RX ORDER — TRAZODONE HYDROCHLORIDE 50 MG/1
50 TABLET, FILM COATED ORAL AT BEDTIME
Qty: 30 TABLET | Refills: 0 | Status: SHIPPED | OUTPATIENT
Start: 2024-10-10

## 2024-10-10 RX ORDER — ROPINIROLE 2 MG/1
2 TABLET, FILM COATED ORAL AT BEDTIME
Qty: 30 TABLET | Refills: 0 | Status: SHIPPED | OUTPATIENT
Start: 2024-10-10

## 2024-10-10 RX ADMIN — ROSUVASTATIN CALCIUM 20 MG: 10 TABLET, FILM COATED ORAL at 09:07

## 2024-10-10 RX ADMIN — ARIPIPRAZOLE 15 MG: 15 TABLET ORAL at 09:07

## 2024-10-10 RX ADMIN — CHLORPROMAZINE HYDROCHLORIDE 25 MG: 25 TABLET, FILM COATED ORAL at 14:35

## 2024-10-10 ASSESSMENT — ACTIVITIES OF DAILY LIVING (ADL)
ADLS_ACUITY_SCORE: 35

## 2024-10-10 ASSESSMENT — COLUMBIA-SUICIDE SEVERITY RATING SCALE - C-SSRS
TOTAL  NUMBER OF INTERRUPTED ATTEMPTS SINCE LAST CONTACT: NO
ATTEMPT SINCE LAST CONTACT: NO
TOTAL  NUMBER OF ABORTED OR SELF INTERRUPTED ATTEMPTS SINCE LAST CONTACT: NO
6. HAVE YOU EVER DONE ANYTHING, STARTED TO DO ANYTHING, OR PREPARED TO DO ANYTHING TO END YOUR LIFE?: NO
SUICIDE, SINCE LAST CONTACT: NO
2. HAVE YOU ACTUALLY HAD ANY THOUGHTS OF KILLING YOURSELF?: NO
1. SINCE LAST CONTACT, HAVE YOU WISHED YOU WERE DEAD OR WISHED YOU COULD GO TO SLEEP AND NOT WAKE UP?: NO

## 2024-10-10 NOTE — DISCHARGE INSTRUCTIONS
[facility] - Scheduled Appointment  You have been scheduled a telephone appointment with the [facility]. As a reminder, this is not an in-person appointment. A Navigator will contact you at your personal telephone number on Friday October 11th, 2024 at 9AM. You can expect a 15-30 minute appointment. You will discuss programming options and be assisted in next steps. If you have any further questions or concerns, please contact the [facility] Hub at [phone]. Note: You will not be charged for this telephone appointment.        Your Upcoming Appointment:  Type: Medication Mgmt - Initial (In-Person)  Date: Monday, 10/14/2024  Time: 4:00 pm - 5:00 pm  Provider: Nancy MORFIN  CNP,PMHNP,RN  Location: [place] [address]  Phone: [phone]  Patient Instructions: All paperwork to be completed online prior to appointment. Paperwork link will be sent via email.

## 2024-10-10 NOTE — ED NOTES
PT in Sensory room A States he is very tired. . States he is depressed but no SI or AH today. States he feels safe to go back to his assisted living. Pt falls back asleep after interactions.Ate breakfast VSS.

## 2024-10-10 NOTE — ED PROVIDER NOTES
"[facility] Unit - Psychiatric Observation Discharge Summary  [facility] Emergency Department  Discharge Date: 10/10/2024    Benitez Castillo MRN: [id number]   Age: 54 year old YOB: 1970     Brief HPI & Initial ED Course     Chief Complaint   Patient presents with    Psychiatric Evaluation     HPI  Benitez Castillo is a 54 year old male with history notable for schizoaffective disorder further complicated by substance use involving methamphetamines and cannabis.  He is currently under observation status on the [facility] unit, now approaching 46 hours in the emergency department.  Overnight, there were no acute issues.  On examination today, the patient is happy to announce that he slept very well last night.  Restless leg symptoms were minimal.  Trazodone helped improve his sleep.  As a result, he feels well rested, his mood is improved, and anxiety is low today.  He denied suicidal and homicidal thoughts.  Psychotic symptoms have reduced and are near absent.  He interprets readiness to discharge home today.        Physical Examination   BP: 135/78  Pulse: 102  Temp: 97.6  F (36.4  C)  Resp: 16  Height: 177.2 cm (5' 9.75\")  Weight: 98.5 kg (217 lb 3.2 oz)  SpO2: 97 %    Physical Exam  General: Appears stated age.   Neuro: Alert and fully oriented. Extremities appear to demonstrate normal strength on visual inspection.   Integumentary/Skin: no rash visualized, normal color    Psychiatric Examination   Appearance: awake, alert  Attitude:  cooperative  Eye Contact:  fair  Mood:  better  Affect:  appropriate and in normal range  Speech:  clear, coherent  Psychomotor Behavior:  no evidence of tardive dyskinesia, dystonia, or tics  Thought Process:  logical and linear  Associations:  no loose associations  Thought Content:  no evidence of suicidal ideation or homicidal ideation and no evidence of psychotic thought  Insight:  fair  Judgement:  fair  Oriented to:  time, person, and place  Attention Span and Concentration:  " fair  Recent and Remote Memory:  fair  Language: able to name/identify objects without impairment  Fund of Knowledge: intact with awareness of current and past events    Results     ED Course as of 10/10/24 1340   Tue Oct 08, 2024   2018 I evaluated the patient       Labs Ordered and Resulted from Time of ED Arrival to Time of ED Departure   URINE DRUG SCREEN PANEL - Abnormal       Result Value    Amphetamines Urine Screen Positive (*)     Barbituates Urine Screen Negative      Benzodiazepine Urine Screen Negative      Cannabinoids Urine Screen Positive (*)     Cocaine Urine Screen Negative      Fentanyl Qual Urine Screen Negative      Opiates Urine Screen Negative      PCP Urine Screen Negative     GLUCOSE BY METER - Abnormal    GLUCOSE BY METER POCT 219 (*)    GLUCOSE BY METER - Abnormal    GLUCOSE BY METER POCT 287 (*)    GLUCOSE BY METER - Abnormal    GLUCOSE BY METER POCT 194 (*)    GLUCOSE MONITOR NURSING POCT   GLUCOSE MONITOR NURSING POCT   FERRITIN       Observation Course   The patient was found to have a psychiatric condition that would benefit from an observation stay in the emergency department for further psychiatric stabilization and/or coordination of a safe disposition. The plan upon observation admission included serial assessments of psychiatric condition, potential administration of medications if indicated, further disposition pending the patient's psychiatric course during the monitoring period.     Serial assessments of the patient's psychiatric condition were performed. Nursing notes were reviewed. During the observation period, the patient did not require medications for agitation, and did not require restraints/seclusion for patient and/or provider safety.     After a period of working with the treatment team on the [facility] unit, the patient's mental state improved to allow a safe transition to outpatient care. After counseling on the diagnosis, work-up, and treatment plan, the patient was  discharged. Close follow-up with a psychiatrist and/or therapist was recommended and community psychiatric resources were provided. Patient is to return to the ED if any urgent or potentially life-threatening concerns.     Discharge Diagnoses:   Final diagnoses:   Schizoaffective disorder, bipolar type (H)   Stimulant use disorder - methamphetamine   Cannabis abuse       Treatment Plan:  -Continue Abilify 15 mg daily and Thorazine 50 mg nightly for antipsychotic treatment.  His longer term medication plan could involve dose optimization of Abilify in hopes of minimizing polypharmacy.  -Continue trazodone 50 mg nightly to address insomnia  -Continue gabapentin 600 mg nightly for anxiety and restless leg.  -Continue Requip 2 mg nightly to better address restless leg symptoms which have not yet responded to gabapentin  -Check serum ferritin levels to rule out iron deficiency as a cause of restless leg symptoms; thyroid functions checked a couple of months ago were within normal limits.  Labs are currently pending.  -The patient is not interested in pursuing formal substance use disorder treatment.  He anticipates abstaining from substance use on his own and will consider community supportive programming if desired.  -Resume outpatient medication management appointments and psychotherapy  -Continue observation status and reassess tomorrow      At the time of discharge, the patient's acute suicide risk was determined to be low due to the following factors: Reduction in the intensity of mood/anxiety symptoms that preceded the admission, denial of suicidal thoughts, denies feeling helpless or helpless, not currently under the influence of alcohol or illicit substances, denies experiencing command hallucinations, no immediate access to firearms. The patient's acute risk could be higher if noncompliant with their treatment plan, medications, follow-up appointments or using illicit substances or alcohol. Protective factors  include: social supports, stable housing    I spent more than 30 minutes on discharge day activities.    --  Nigel Rodriguez MD  [facility] EMERGENCY DEPT  EmPATH Unit       Nigel Rodriguez MD  10/10/24 [id number]       Nigel Rodriguez MD  10/10/24 [id number]

## 2024-10-10 NOTE — ED NOTES
Patient agrees to discharge plan. Discharge instructions reviewed with patient including follow-up care plan. Medications: Trazodone and Requip given to the pt, the pt verbalized understanding of the medications. Reviewed safety plan and outpatient resources. Denies SI and HI. All belongings that were brought into the hospital have been returned to patient. Escorted off the unit at 1629 accompanied by [facility] staff. Discharged to home via taxi.

## 2024-10-10 NOTE — PROGRESS NOTES
"Triage and Transition Services Extended Care Reassessment     Patient: Benitez goes by \"Benitez,\" uses he/him pronouns  Date of Service: October 10, 2024  Site of Service: [facility] EMERGENCY DEPT                             EMP01  Patient was seen yes  Mode of Assessment: In person     Reason for Reassessment: other (see comment) (discharge/disposition planning)    History of Patient's Original Emergency Room Encounter: Patient has a history notable for schizoaffective disorder and substance use disorder. He presented to the ED on 10/08/24 due to command auditory hallucinations telling him to kill himself, insomnia, and paranoia further complicated by recent methamphetamine use.    Current Patient Presentation: At the time of reassessment, patient reported no longer experiencing auditory hallucinations. He was observed to have been sleeping throughout the night and appears well rested and groomed. Pt denied suicidal ideation and homicidal ideation.    Presentation Summary: Patient stated that he has been to CD treatment 21 times. Although he would like to be sober from methamphetamines, he is not interested in it at this time. However, he is willing to consider programmatic care for mental health treatment. He would like to be set up with outpatient psychiatric medication management. He stated that he had 6 years of sobriety until he relapsed on year ago. He reported using meth approximately once a month. His triggers include boredom, loneliness, and anger. He reported a lack of structure in his days. He has been unemployed for over five months. He stated that he is trying to get social security benefits and his goal is to find employment. 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    Changes Observed Since Initial Assessment: decrease in presenting symptoms, patient/family request    Therapeutic Interventions Provided: Engaged in guided discovery, explored patient's " perspectives and helped expand them through socratic dialogue., Reviewed healthy living that supports positive mental health, including looking at sleep hygiene, regular movement, nutrition, and regular socialization., Provided positive reinforcement for progress towards goals, gains in knowledge, and application of skills previously taught., Explored motivation for behavioral change., Worked on relapse prevention planning (review of stressors, early warning signs, written plan to respond to signs, and rehearse plan).    Current Symptoms:  (none reported)  (none reported)  (none reported)  (none reported)  (none reported)    Mental Status Exam   Affect: Appropriate  Appearance: Appropriate  Attention Span/Concentration: Attentive  Eye Contact: Engaged    Fund of Knowledge: Appropriate   Language /Speech Content: Fluent  Language /Speech Volume: Normal  Language /Speech Rate/Productions: Normal  Recent Memory: Intact  Remote Memory: Intact  Mood: Normal  Orientation to Person: Yes   Orientation to Place: Yes  Orientation to Time of Day: Yes  Orientation to Date: Yes     Situation (Do they understand why they are here?): Yes  Psychomotor Behavior: Normal  Thought Content: Clear  Thought Form: Goal Directed    Treatment Objective(s) Addressed: rapport building, processing feelings, identifying an appropriate aftercare plan, assessing safety, identifying treatment goals    Patient Response to Interventions: acceptance expressed, verbalizes understanding    Progress Towards Goals:  Patient Reports Symptoms Are: improving  Patient Progress Toward Goals: is making progress  Comment: At the time of reassessment, patient reported no longer experiencing auditory hallucinations and has been sleeping well with trazadone.  Next Step to Work Toward Discharge: follow up on referrals  Symptom Stabilization Comment: Pt scheduled for outpatient psychiatric med management and programmatic care referral    Case Management: Case  Management Included: collaborating with patient's support system  Summary of Interaction: Writer called Gabriela (Daughter) [phone] (Mobile). She asked writer to call back as she was bust at that time. Writer called back an hour later and was unable to reach Gabriela.    C-SSRS Since Last Contact:   1. Wish to be Dead (Since Last Contact): No  2. Non-Specific Active Suicidal Thoughts (Since Last Contact): No     Actual Attempt (Since Last Contact): No  Has subject engaged in non-suicidal self-injurious behavior? (Since Last Contact): No  Interrupted Attempts (Since Last Contact): No  Aborted or Self-Interrupted Attempt (Since Last Contact): No  Preparatory Acts or Behavior (Since Last Contact): No  Suicide (Since Last Contact): No     Calculated C-SSRS Risk Score (Since Last Contact): No Risk Indicated    Plan: Final Disposition / Recommended Care Path: discharge  Plan for Care reviewed with assigned Medical Provider: yes  Plan for Care Team Review: provider, RN  Comments: Dr. Rodriguez  Patient and/or validated legal guardian concurs: yes  Clinical Substantiation: Although he would like to be sober from methamphetamines, he is not interested in it at this time. However, he is willing to consider programmatic care for mental health treatment. He would like to be set up with outpatient psychiatric medication management as well. He is future thinking, evidenced by trying to get social security benefits and his goal is to find employment. It is the recommendation of this writer that he discharge and follow up with outpatient supports scheduled at the time of discharge.    Legal Status: Legal Status at Admission: Voluntary/Patient has signed consent for treatment    Session Status: Time session started: 1245  Time session ended: 0105  Session Duration (minutes): 20 minutes  Session Number: 2  Anticipated number of sessions or this episode of care: 2    Session Start Time: 1245  Session Stop Time: 0105  CPT codes: 03362  - Psychotherapy (with patient) - 30 (16-37*) min  Time Spent: 20 minutes      CPT code(s) utilized: 33080 - Psychotherapy (with patient) - 30 (16-37*) min    Diagnosis:   Patient Active Problem List   Diagnosis Code    Suicide attempt by drug ingestion (H) T50.902A    Hepatitis C antibody test positive R76.8    Suicidal ideation R45.851    Suicide attempt (H) T14.91XA    Polysubstance abuse (H) F19.10    Schizoaffective disorder, bipolar type (H) F25.0    Schizoaffective disorder, chronic condition with acute exacerbation (H) F25.9    Cannabis abuse F12.10    Stimulant use disorder F15.90       Primary Problem This Admission: Active Hospital Problems    Cannabis abuse      Stimulant use disorder      *Schizoaffective disorder, bipolar type (H) F25      BRANDI Ray   Licensed Mental Health Professional (LMHP), [facility]  [phone]

## 2024-10-10 NOTE — PROGRESS NOTES
Pt visible on the unit, isolative to himself. Spent most of the evening resting in the recliner. Pt endorsing auditory hallucination,denies any SI/HI. Pt requested to go sleep somewhere quiet, he was given sensory A to sleep in. Contracts for safety on the unit.